# Patient Record
Sex: FEMALE | Race: WHITE | NOT HISPANIC OR LATINO | Employment: OTHER | URBAN - METROPOLITAN AREA
[De-identification: names, ages, dates, MRNs, and addresses within clinical notes are randomized per-mention and may not be internally consistent; named-entity substitution may affect disease eponyms.]

---

## 2017-01-13 ENCOUNTER — GENERIC CONVERSION - ENCOUNTER (OUTPATIENT)
Dept: OTHER | Facility: OTHER | Age: 72
End: 2017-01-13

## 2017-01-31 ENCOUNTER — GENERIC CONVERSION - ENCOUNTER (OUTPATIENT)
Dept: OTHER | Facility: OTHER | Age: 72
End: 2017-01-31

## 2017-03-24 ENCOUNTER — ALLSCRIPTS OFFICE VISIT (OUTPATIENT)
Dept: OTHER | Facility: OTHER | Age: 72
End: 2017-03-24

## 2017-03-24 DIAGNOSIS — M85.80 OTHER SPECIFIED DISORDERS OF BONE DENSITY AND STRUCTURE, UNSPECIFIED SITE: ICD-10-CM

## 2017-03-30 ENCOUNTER — GENERIC CONVERSION - ENCOUNTER (OUTPATIENT)
Dept: OTHER | Facility: OTHER | Age: 72
End: 2017-03-30

## 2017-05-27 DIAGNOSIS — Z12.31 ENCOUNTER FOR SCREENING MAMMOGRAM FOR MALIGNANT NEOPLASM OF BREAST: ICD-10-CM

## 2017-05-30 ENCOUNTER — HOSPITAL ENCOUNTER (OUTPATIENT)
Dept: RADIOLOGY | Age: 72
Discharge: HOME | End: 2017-05-30
Payer: MEDICARE

## 2017-05-30 DIAGNOSIS — M85.80 OTHER SPECIFIED DISORDERS OF BONE DENSITY AND STRUCTURE, UNSPECIFIED SITE: ICD-10-CM

## 2017-05-30 DIAGNOSIS — Z12.31 ENCOUNTER FOR SCREENING MAMMOGRAM FOR MALIGNANT NEOPLASM OF BREAST: ICD-10-CM

## 2017-05-30 PROCEDURE — G0202 SCR MAMMO BI INCL CAD: HCPCS

## 2017-05-30 PROCEDURE — 77080 DXA BONE DENSITY AXIAL: CPT

## 2017-09-19 ENCOUNTER — TRANSCRIBE ORDERS (OUTPATIENT)
Dept: ADMINISTRATIVE | Facility: HOSPITAL | Age: 72
End: 2017-09-19

## 2017-09-19 DIAGNOSIS — R91.8 LUNG MASS: Primary | ICD-10-CM

## 2017-12-11 ENCOUNTER — HOSPITAL ENCOUNTER (OUTPATIENT)
Dept: RADIOLOGY | Age: 72
Discharge: HOME | End: 2017-12-11
Payer: MEDICARE

## 2017-12-11 ENCOUNTER — GENERIC CONVERSION - ENCOUNTER (OUTPATIENT)
Dept: OTHER | Facility: OTHER | Age: 72
End: 2017-12-11

## 2017-12-11 DIAGNOSIS — R91.8 LUNG MASS: ICD-10-CM

## 2017-12-11 PROCEDURE — 71250 CT THORAX DX C-: CPT

## 2017-12-14 ENCOUNTER — GENERIC CONVERSION - ENCOUNTER (OUTPATIENT)
Dept: OTHER | Facility: OTHER | Age: 72
End: 2017-12-14

## 2018-01-09 NOTE — RESULT NOTES
Verified Results  * DXA BONE DENSITY SPINE HIP AND PELVIS [id number] 10:03AM Zakia Gutierrez Order Number: [id number]    - Patient Instructions: To schedule this appointment, please contact Central Scheduling at [phone]  Test Name Result Flag Reference   DXA BONE DENSITY SPINE HIP AND PELVIS (Report)     CENTRAL DXA SCAN     CLINICAL HISTORY:  81year old post-menopausal  female risk factors include previous smoking  History of breast carcinoma and lymphoma  Prior Fosamax use  TECHNIQUE: Bone densitometry was performed using a Hologic Horizon A bone densitometer  Regions of interest appear properly placed  There are no obvious fractures or other confounding variables which could limit the study  Degenerative changes of the    lumbar spine and hip  This will falsely elevate the bone mineral densities in these regions  COMPARISON: Several, most recent May 26, 2015     RESULTS:    LUMBAR SPINE: L1-L4:   BMD 1 0 77 gm/cm2   T-score 0 3   Z-score 2 4     LEFT TOTAL HIP:   BMD 0 843 gm/cm2   T-score -0 8   Z-score 0 7     LEFT FEMORAL NECK:   BMD 0 642 gm/cm2   T-score -1 9   Z-score -0 1     LEFT FOREARM :   BMD 0 629 gm/sq-cm,   T-score is -0 9   Z-score is 1 1          IMPRESSION:   1  Based on the [facility] classification, the T-score of -1 9 in the left hip is consistent with low bone mineral density  2  When compared to the prior examination, there has been NO SIGNIFICANT CHANGE in the total bone mineral density of the lumbar spine, when allowing for the least significant change  There has however been a 3% INCREASE in the total bone mineral    density of the left hip and a 3% INCREASE in the total bone mineral density of the left forearm  3  Any secondary causes of low bone mineral density should be excluded prior to treatment, if clinically indicated     4  A daily intake of at least 1200 mg calcium and 800 to 1000 IU of Vitamin D, as well as weight bearing and muscle strengthening exercise, fall prevention and avoidance of tobacco and excessive alcohol intake as basic preventive measures are suggested  5  Repeat DXA in 18 - 24 months, on the same machine, as clinically indicated  The 10 year risk of hip fracture is 2%, with the 10 year risk of major osteoporotic fracture being 11%, as calculated by the [facility] fracture risk assessment tool (FRAX)  The current NOF guidelines recommend treating patients with FRAX 10 year risk score of   >3% for hip fracture and >20% for major osteoporotic fracture        WHO CLASSIFICATION:   Normal (a T-score of -1 0 or higher)   Low bone mineral density (a T-score of less than -1 0 but higher than -2 5)   Osteoporosis (a T-score of -2 5 or less)   Severe osteoporosis (a T-score of -2 5 or less with a fragility fracture)             Workstation performed: [id number]     Signed by:   Kingston Fall DO   5/30/17

## 2018-01-10 NOTE — MISCELLANEOUS
Message   Recorded as Task   Date: 05/30/2017 02:34 PM, Created By: Dom Lerma   Task Name: Follow Up   Assigned To: Rio Dow   Regarding Patient: Foster Ram, Status: In Progress   CommentCheree Azael - 30 May 2017 2:34 PM     TASK CREATED  Call Katiana Hargrove her bone density test shows osteopenia  This is best treated with calcium 1200 mg a day and vitamin D 1000 units a day plus exercise thank you   Dee Chowdary - 30 May 2017 3:26 PM     TASK IN PROGRESS   Dee Chowdary - 30 May 2017 3:28 PM     TASK EDITED  pt informed        Active Problems    1  Atrophic vaginitis (627 3) (N95 2)   2  Benign positional vertigo (386 11) (H81 10)   3  Encounter for gynecological examination without abnormal finding (V72 31) (Z01 419)   4  Encounter for screening mammogram for malignant neoplasm of breast (V76 12)   (Z12 31)   5  Leiomyoma of uterus (218 9) (D25 9)   6  Multiple lung nodules (793 19) (R91 8)   7  Osteopenia (733 90) (M85 80)   8  Routine Gynecological Exam With Cervical Pap Smear (V72 31)   9  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)    Current Meds   1  B-12 CAPS; take 1 capsule daily; Therapy: (Recorded:53Cpz0341) to Recorded   2  BL Vitamin B-6 TABS; TAKE 1 TABLET DAILY AS DIRECTED; Therapy: (Recorded:63Kjk4531) to Recorded   3  Calcium TABS; take 2 tablet daily; Therapy: (Recorded:21Byb8378) to Recorded   4  Fish Oil CAPS; take 1 capsule daily; Therapy: (Recorded:35Fxh4502) to Recorded   5  Multi-Vitamin TABS; TAKE 1 TABLET DAILY; Therapy: (Recorded:38Osq2054) to Recorded    Allergies    1   No Known Drug Allergies    Signatures   Electronically signed by : Valdo Fonseca, ; May 30 2017  3:28PM EST                       (Author)

## 2018-01-12 NOTE — RESULT NOTES
Verified Results  (1) THIN PREP PAP WITH IMAGING 22Mar2016 11:38AM Malik Oropeza     Test Name Result Flag Reference   LAB AP CASE REPORT (Report)     Gynecologic Cytology Report            Case: [id number]                  Authorizing Provider: Ozzie Jim MD     Collected:      03/22/2016 1138        First Screen:     Wilmer MongeCATINA christine    Received:      03/24/2016 1138        Specimen:  LIQUID-BASED PAP, SCREENING, Cervix   LAB AP GYN PRIMARY INTERPRETATION      Negative for intraepithelial lesion or malignancy   LAB AP GYN SPECIMEN ADEQUACY      Satisfactory for evaluation  (See note)   LAB AP GYN NOTE      No endocervical cells identified  It is difficult to differentiate between   squamous metaplastic cells and parabasal cells in atrophic specimens due   to numerous causes including menopause, postpartum changes and   progestational agents  LAB AP GYN ADDITIONAL INFORMATION (Report)     Yeah[employer]'s FDA approved ,  and ThinPrep Imaging System are   utilized with strict adherence to the 's instruction manual to   prepare gynecologic and non-gynecologic cytology specimens for the   production of ThinPrep slides as well as for gynecologic ThinPrep imaging  These processes have been validated by our laboratory and/or by the     The Pap test is not a diagnostic procedure and should not be used as the   sole means to detect cervical cancer  It is only a screening procedure to   aid in the detection of cervical cancer and its precursors  Both   false-negative and false-positive results have been experienced  Your   patient's test result should be interpreted in this context together with   the history and clinical findings     LAB AP LMP

## 2018-01-12 NOTE — MISCELLANEOUS
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)    Current Meds   1  B-12 CAPS; take 1 capsule daily; Therapy: (Recorded:31Ssp8109) to Recorded   2  BL Vitamin B-6 TABS; TAKE 1 TABLET DAILY AS DIRECTED; Therapy: (Recorded:58Kjl1658) to Recorded   3  Calcium TABS; take 2 tablet daily; Therapy: (Recorded:89Dgh3136) to Recorded   4  Fish Oil CAPS; take 1 capsule daily; Therapy: (Recorded:94Sbd7663) to Recorded   5  Meclizine HCl - 12 5 MG Oral Tablet; Take 1 -> 2 tabs TID PRN; Therapy: 84ZOM3834 to (Last Rx:21Fzk3200)  Requested for: 88Bhx1543 Ordered   6  Multi-Vitamin TABS; TAKE 1 TABLET DAILY; Therapy: (Recorded:07Ykn2915) to Recorded    Allergies    1   No Known Drug Allergies    Signatures   Electronically signed by : Melvi Myers, ; Jan 13 2017 10:57AM EST                       (Author)

## 2018-01-13 VITALS
HEIGHT: 58 IN | WEIGHT: 130 LBS | SYSTOLIC BLOOD PRESSURE: 132 MMHG | BODY MASS INDEX: 27.29 KG/M2 | DIASTOLIC BLOOD PRESSURE: 78 MMHG

## 2018-01-13 NOTE — MISCELLANEOUS
Message   Recorded as Task   Date: 12/10/2016 11:27 AM, Created By: Tommy Tanner   Task Name: Miscellaneous   Assigned To: Flor Zelaya   Regarding Patient: Artist Caller, Status: Active   Comment:    Rj Mistry - 10 Dec 2016 11:27 AM     TASK CREATED  Please send patient an appointment within one or 2 months  She was supposed to have  an appointment on 12/12/16 at 9 AM but I don't see her name anywhere  Thanks,  Fede   pt is r/s for 01/06/2017 office appt  Active Problems    1  Atrophic vaginitis (627 3) (N95 2)   2  Benign positional vertigo (386 11) (H81 10)   3  Encounter for gynecological examination without abnormal finding (V72 31) (Z01 419)   4  Encounter for screening mammogram for malignant neoplasm of breast (V76 12)   (Z12 31)   5  Leiomyoma of uterus (218 9) (D25 9)   6  Multiple lung nodules (793 19) (R91 8)   7  Osteopenia (733 90) (M85 80)   8  Routine Gynecological Exam With Cervical Pap Smear (V72 31)   9  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)    Current Meds   1  B-12 CAPS; take 1 capsule daily; Therapy: (Recorded:42Lie1430) to Recorded   2  BL Vitamin B-6 TABS; TAKE 1 TABLET DAILY AS DIRECTED; Therapy: (Recorded:29Bqn7635) to Recorded   3  Calcium TABS; take 2 tablet daily; Therapy: (Recorded:88Uuf1514) to Recorded   4  Fish Oil CAPS; take 1 capsule daily; Therapy: (Recorded:06Fze5809) to Recorded   5  Meclizine HCl - 12 5 MG Oral Tablet; Take 1 -> 2 tabs TID PRN; Therapy: 81RHB9038 to (Last Rx:70Chw3255)  Requested for: 24Mie3654 Ordered   6  Multi-Vitamin TABS; TAKE 1 TABLET DAILY; Therapy: (Recorded:98Oub3056) to Recorded    Allergies    1   No Known Drug Allergies    Signatures   Electronically signed by : Radha Chandler, ; Dec 14 2016  2:10PM EST                       (Author)

## 2018-01-15 NOTE — RESULT NOTES
Verified Results  * CT CHEST WO CONTRAST [id number] 08:22AM Rox Gamboa Order Number: [id number]     Test Name Result Flag Reference   CT CHEST WO CONTRAST (Report)     CT CHEST WITHOUT IV CONTRAST     INDICATION: Pulmonary nodule  Previous history of lymphoma     COMPARISON: 6/2/2016     TECHNIQUE: CT examination of the chest was performed without intravenous contrast  Axial, sagittal and coronal reformatted images were submitted for interpretation  Coronal thick section MIP (maximal intensity projection) images were also created  This examination, like all CT scans performed in the [facility], was performed utilizing techniques to minimize radiation dose exposure, including the use of iterative reconstruction and automated exposure control  FINDINGS:     LUNGS: Right upper lobe groundglass nodule difficult to visualize on today's axial images but is seen on coronal image 57, measuring approximately 4 mm in size, unchanged from prior  Subpleural nodularity in the posterior right upper lobe on image 3/19   is unchanged  Right middle lobe density on image 3/34 measures approximately 3 to 4 mm in length no larger than prior study  It may be within the bronchus reflecting mucous plug although there is no distal atelectatic change  2 mm nodular opacity    adjacent to the right diaphragm image 3/47, stable  There is further diminishment in size of a nodular density in the right lower lobe, image 3/32, now no greater than 2 mm  There is stable right middle lobe scarring  There is biapical    pleural-parenchymal scarring  No new or enlarging pulmonary opacities are seen  PLEURA: Unremarkable  HEART/GREAT VESSELS: Unremarkable for patient's age  MEDIASTINUM AND MANUEL: Unremarkable  CHEST WALL AND LOWER NECK: Unremarkable  VISUALIZED STRUCTURES IN THE UPPER ABDOMEN: Unremarkable  OSSEOUS STRUCTURES: No acute fracture  No destructive osseous lesion  IMPRESSION:     All previously seen pulmonary nodular opacities are either the same size with diminished in size compared to the previous examination  There are no new or enlarging pulmonary nodules  Therefore, follow-up is recommended in approximately one year's    time  ##fuslh12##fuslh12       Workstation performed: [id number]     Signed by:   Oriana Braga MD   12/7/16       Plan  Multiple lung nodules    · * CT CHEST WO CONTRAST; Status:Need Information - Financial Authorization;   Requested for:00Bbu4486;

## 2018-01-16 NOTE — MISCELLANEOUS
Message  PT CALLED TO STATE SHE RECEIVED A LETTER STATING THAT SHE MISSED HER JANUARY APPT WITH  [id number]  GOING BACK IN THE NOTES, DR MUNOZ ASKED IN DECEMBER, THAT AN APPT BE MADE FOR THE PT IN 1-2 MONTHS  PT STATED ALSO THAT DR MUNOZ HAD SAID BACK IN JUNE 2016 THAT SHE COULD FOLLOW UP WITH DR Dom Lester  SHE ALSO STATES THAT IN NOVEMBER SHE CALLED TO CANCEL THAT DECEMBER APPT  Active Problems    1  Atrophic vaginitis (627 3) (N95 2)   2  Benign positional vertigo (386 11) (H81 10)   3  Encounter for gynecological examination without abnormal finding (V72 31) (Z01 419)   4  Encounter for screening mammogram for malignant neoplasm of breast (V76 12)   (Z12 31)   5  Leiomyoma of uterus (218 9) (D25 9)   6  Multiple lung nodules (793 19) (R91 8)   7  Osteopenia (733 90) (M85 80)   8  Routine Gynecological Exam With Cervical Pap Smear (V72 31)   9  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)    Current Meds   1  B-12 CAPS; take 1 capsule daily; Therapy: (Recorded:10Kbb7566) to Recorded   2  BL Vitamin B-6 TABS; TAKE 1 TABLET DAILY AS DIRECTED; Therapy: (Recorded:63Vdr5110) to Recorded   3  Calcium TABS; take 2 tablet daily; Therapy: (Recorded:42Wwf6624) to Recorded   4  Fish Oil CAPS; take 1 capsule daily; Therapy: (Recorded:50Acf5132) to Recorded   5  Meclizine HCl - 12 5 MG Oral Tablet; Take 1 -> 2 tabs TID PRN; Therapy: 22AKK3924 to (Last Rx:82Ljc3120)  Requested for: 17Aug2016 Ordered   6  Multi-Vitamin TABS; TAKE 1 TABLET DAILY; Therapy: (Recorded:15Mav4378) to Recorded    Allergies    1   No Known Drug Allergies    Signatures   Electronically signed by : Luis Umana, ; Jan 31 2017  1:13PM EST                       (Author)

## 2018-01-16 NOTE — RESULT NOTES
Verified Results  * MAMMO SCREENING BILATERAL W CAD [id number] 09:13AM Katherine Newman Order Number: [id number]     Test Name Result Flag Reference   MAMMO SCREENING BILATERAL W CAD (Report)     Patient History:   Patient is postmenopausal and has history of other cancer at age    81  Family history of premenopausal breast cancer in maternal cousin    at age 29  Patient is a former smoker, and smoked for 20 years  Patient's    BMI is 26 1  Reason for exam: screening (asymptomatic)  Mammo Screening Bilateral W CAD: May 27, 2016 - Check In #:    [de-identified]   Bilateral MLO and CC view(s) were taken  Technologist: ARNALDO Godoy (R)(M)   Prior study comparison: May 26, 2015, digital bilateral screening   mammogram performed at [facility]  May 21,    2014, digital bilateral screening mammogram performed at [facility]  May 20, 2013, digital bilateral    screening mammogram performed at [facility]  May 14, 2012, digital bilateral screening mammogram performed at    [facility]  May 9, 2011, digital bilateral    screening mammogram performed at [facility]  April 26, 2010, digital bilateral screening mammogram performed    at [facility]  There are scattered fibroglandular densities  No dominant soft tissue mass, architectural distortion or    suspicious calcifications are noted in either breast  The skin    and nipple contours are within normal limits  No significant changes when compared with prior studies  ASSESSMENT: BiRad:1 - Negative     Recommendation:   Routine screening mammogram of both breasts in 1 year  A    reminder letter will be scheduled  Analyzed by CAD     8-10% of cancers will be missed on mammography  Management of a    palpable abnormality must be based on clinical grounds  Patients   will be notified of their results via letter from our facility  Accredited by [employer] of [employer] and FDA  Transcription Location: ARNALDO [address]dujar 98: [id number]     Risk Value(s):   Tyrer-Cuzick 10 Year: 3 845%, Tyrer-Cuzick Lifetime: 6 530%,    Myriad Table: 2 6%, FELIZ 5 Year: 2 1%, NCI Lifetime: 6 4%                             (1) THIN PREP PAP WITH IMAGING 22Mar2016 11:38AM Carly Cosme     Test Name Result Flag Reference   LAB AP CASE REPORT (Report)     Gynecologic Cytology Report            Case: [id number]                  Authorizing Provider: Shea Rico MD     Collected:      03/22/2016 1138        First Screen:     Gabby Urias, CT    Received:      03/24/2016 1138        Specimen:  LIQUID-BASED PAP, SCREENING, Cervix   LAB AP GYN PRIMARY INTERPRETATION      Negative for intraepithelial lesion or malignancy   LAB AP GYN SPECIMEN ADEQUACY      Satisfactory for evaluation  (See note)   LAB AP GYN NOTE      No endocervical cells identified  It is difficult to differentiate between   squamous metaplastic cells and parabasal cells in atrophic specimens due   to numerous causes including menopause, postpartum changes and   progestational agents  LAB AP GYN ADDITIONAL INFORMATION (Report)     2 Minutes's FDA approved ,  and ThinPrep Imaging System are   utilized with strict adherence to the 's instruction manual to   prepare gynecologic and non-gynecologic cytology specimens for the   production of ThinPrep slides as well as for gynecologic ThinPrep imaging  These processes have been validated by our laboratory and/or by the     The Pap test is not a diagnostic procedure and should not be used as the   sole means to detect cervical cancer  It is only a screening procedure to   aid in the detection of cervical cancer and its precursors  Both   false-negative and false-positive results have been experienced   Your   patient's test result should be interpreted in this context together with   the history and clinical findings     LAB AP LMP

## 2018-01-18 NOTE — RESULT NOTES
Verified Results  * MAMMO SCREENING BILATERAL W CAD [id number] 10:04AM [facility] Order Number: [id number]    - Patient Instructions: To schedule this appointment, please contact Central Scheduling at [phone]  Do not wear any perfume, powder, lotion or deodorant on breast or underarm area  Please bring your doctors order, referral (if needed) and insurance information with you on the day of the test  Failure to bring this information may result in this test being rescheduled  Arrive 15 minutes prior to your appointment time to register  On the day of your test, please bring any prior mammogram or breast studies with you that were not performed at a [facility]  Failure to bring prior exams may result in your test needing to be rescheduled  Test Name Result Flag Reference   MAMMO SCREENING BILATERAL W CAD (Report)     Patient History:   Patient is postmenopausal and has history of other cancer at age    81  Family history of premenopausal breast cancer at age 29 in    maternal cousin  No Hormone Replacement Therapy   Patient is a former smoker, and smoked for 20 years  Patient's    BMI is 26 1  Reason for exam: screening, asymptomatic  Mammo Screening Bilateral W CAD: May 30, 2017 - Check In #:    [de-identified]   Bilateral MLO and CC view(s) were taken  XCCL view(s) were taken   of the right breast      Technologist: RT Julio(R)(M)   Prior study comparison: May 27, 2016, mammo screening bilateral W   CAD performed at [address] [place] [address]  May 26, 2015,    digital bilateral screening mammogram performed at [address] [place] [address]  May 21, 2014, digital bilateral screening    mammogram performed at [address] [place] [address]  May 20,    2013, digital bilateral screening mammogram performed at [address] [place] [address]  May 14, 2012, digital bilateral    screening mammogram performed at [address] [place] [address]       There are scattered fibroglandular densities  No new dominant    soft tissue mass, architectural distortion or suspicious    calcifications are noted  The skin and nipple structures are    within normal limits  Benign appearing calcifications are noted  Stable asymmetric tissue again noted in the right breast     Stable intramammary lymphnodes noted  No mammographic evidence of malignancy  No    significant changes when compared with prior studies  ACR BI-RADSï¾® Assessments: BiRad:2 - Benign     Recommendation:   Routine screening mammogram of both breasts in 1 year  A    reminder letter will be scheduled  Analyzed by CAD     8-10% of cancers will be missed on mammography  Management of a    palpable abnormality must be based on clinical grounds  Patients   will be notified of their results via letter from our facility  Accredited by [employer] and FDA  Transcription Location: ARNALDO [address] 98: [id number]     Risk Value(s):   Tyrer-Cuzick 10 Year: 3 700%, Tyrer-Cuzick Lifetime: 5 900%,    Myriad Table: 2 6%, FELIZ 5 Year: 2 1%, NCI Lifetime: 6 1%   Signed by:   Jhoana Pineda MD   5/30/17     * DXA BONE DENSITY SPINE HIP AND PELVIS [id number] 10:03AM Fidencio Contreras Order Number: [id number]    - Patient Instructions: To schedule this appointment, please contact Central Scheduling at [phone]  Test Name Result Flag Reference   DXA BONE DENSITY SPINE HIP AND PELVIS (Report)     CENTRAL DXA SCAN     CLINICAL HISTORY:  81year old post-menopausal  female risk factors include previous smoking  History of breast carcinoma and lymphoma  Prior Fosamax use  TECHNIQUE: Bone densitometry was performed using a Hologic Horizon A bone densitometer  Regions of interest appear properly placed  There are no obvious fractures or other confounding variables which could limit the study  Degenerative changes of the    lumbar spine and hip   This will falsely elevate the bone mineral densities in these regions  COMPARISON: Several, most recent May 26, 2015     RESULTS:    LUMBAR SPINE: L1-L4:   BMD 1 0 77 gm/cm2   T-score 0 3   Z-score 2 4     LEFT TOTAL HIP:   BMD 0 843 gm/cm2   T-score -0 8   Z-score 0 7     LEFT FEMORAL NECK:   BMD 0 642 gm/cm2   T-score -1 9   Z-score -0 1     LEFT FOREARM :   BMD 0 629 gm/sq-cm,   T-score is -0 9   Z-score is 1 1          IMPRESSION:   1  Based on the [facility] classification, the T-score of -1 9 in the left hip is consistent with low bone mineral density  2  When compared to the prior examination, there has been NO SIGNIFICANT CHANGE in the total bone mineral density of the lumbar spine, when allowing for the least significant change  There has however been a 3% INCREASE in the total bone mineral    density of the left hip and a 3% INCREASE in the total bone mineral density of the left forearm  3  Any secondary causes of low bone mineral density should be excluded prior to treatment, if clinically indicated  4  A daily intake of at least 1200 mg calcium and 800 to 1000 IU of Vitamin D, as well as weight bearing and muscle strengthening exercise, fall prevention and avoidance of tobacco and excessive alcohol intake as basic preventive measures are suggested  5  Repeat DXA in 18 - 24 months, on the same machine, as clinically indicated  The 10 year risk of hip fracture is 2%, with the 10 year risk of major osteoporotic fracture being 11%, as calculated by the [facility] fracture risk assessment tool (FRAX)  The current NOF guidelines recommend treating patients with FRAX 10 year risk score of   >3% for hip fracture and >20% for major osteoporotic fracture        WHO CLASSIFICATION:   Normal (a T-score of -1 0 or higher)   Low bone mineral density (a T-score of less than -1 0 but higher than -2 5)   Osteoporosis (a T-score of -2 5 or less)   Severe osteoporosis (a T-score of -2 5 or less with a fragility fracture)             Workstation performed: [id number]     Signed by:   Tawana Zelaya DO   5/30/17

## 2018-01-23 NOTE — RESULT NOTES
Verified Results  * CT CHEST WO CONTRAST [id number] 10:28AM Sinan Morley     Test Name Result Flag Reference   CT CHEST WO CONTRAST (Report)     CT CHEST WITHOUT IV CONTRAST     INDICATION: Follow-up lung nodules  History of cutaneous lymphoma  COMPARISON: 12/6/16  TECHNIQUE: CT examination of the chest was performed without intravenous contrast  Reformatted images were created in axial, sagittal, and coronal planes  Radiation dose length product (DLP) for this visit: 66 mGy-cm   This examination, like all CT scans performed in the [facility], was performed utilizing techniques to minimize radiation dose exposure, including the use of iterative    reconstruction and automated exposure control  FINDINGS:     LUNGS: Stable 2 mm pulmonary nodule in the right lower lobe  Ground glass 4 mm opacity in the right upper lobe, only seen on coronal images current and prior exam  Previously pulmonary nodules No new pulmonary nodules  Scarring in the right middle lobe    is stable  There is no tracheal or endobronchial lesion  PLEURA: Unremarkable  HEART/GREAT VESSELS: Unremarkable for patient's age  MEDIASTINUM AND MANUEL: Unremarkable  CHEST WALL AND LOWER NECK:    Normal      VISUALIZED STRUCTURES IN THE UPPER ABDOMEN: Unremarkable  OSSEOUS STRUCTURES: No acute fracture  No destructive osseous lesion  IMPRESSION:     Right lower lobe 2 mm pulmonary nodule and groundglass 4 mm nodule in the right upper lobe, stable since 6/16  No evidence of new pulmonary nodule                  Workstation performed: [id number]     Signed by:   Gregor Smith MD   12/13/17

## 2018-03-27 ENCOUNTER — ANNUAL EXAM (OUTPATIENT)
Dept: OBGYN CLINIC | Facility: CLINIC | Age: 73
End: 2018-03-27
Payer: MEDICARE

## 2018-03-27 VITALS
WEIGHT: 123 LBS | HEIGHT: 58 IN | DIASTOLIC BLOOD PRESSURE: 76 MMHG | BODY MASS INDEX: 25.82 KG/M2 | SYSTOLIC BLOOD PRESSURE: 136 MMHG

## 2018-03-27 DIAGNOSIS — Z01.419 ENCOUNTER FOR GYNECOLOGICAL EXAMINATION (GENERAL) (ROUTINE) WITHOUT ABNORMAL FINDINGS: Primary | ICD-10-CM

## 2018-03-27 DIAGNOSIS — Z12.31 ENCOUNTER FOR SCREENING MAMMOGRAM FOR MALIGNANT NEOPLASM OF BREAST: ICD-10-CM

## 2018-03-27 PROCEDURE — G0145 SCR C/V CYTO,THINLAYER,RESCR: HCPCS | Performed by: OBSTETRICS & GYNECOLOGY

## 2018-03-27 PROCEDURE — G0101 CA SCREEN;PELVIC/BREAST EXAM: HCPCS | Performed by: OBSTETRICS & GYNECOLOGY

## 2018-03-27 RX ORDER — CALCIUM CARBONATE 500(1250)
2 TABLET ORAL DAILY
COMMUNITY

## 2018-03-27 RX ORDER — MULTIVITAMIN
1 TABLET ORAL DAILY
COMMUNITY

## 2018-03-27 RX ORDER — CHOLECALCIFEROL (VITAMIN D3) 25 MCG
1 TABLET,CHEWABLE ORAL DAILY
COMMUNITY

## 2018-03-27 NOTE — PROGRESS NOTES
Assessment/Plan: This 74-year-old the patient is seen today for her annual gyn evaluation  She is a sore on her left labia majora which comes and goes  No problem-specific Assessment & Plan notes found for this encounter  Subjective:      Patient ID: Delio George is a 81 y o  female  74-year-old patient has had no urine infections over the year she does have a sore on  her labia majora which recurs off and  on and clears  spontaneously without treatment  She did have a CT scan of her lungs in December of 2017 which showed 2 stable nodules in her half long  A DEXA scan May 30, 2017 showed osteopenia for which she takes calcium supplements and vitamin-D  She had a normal mammogram May 30, 2017  She has been treated in the past for skin lymphoma  She does have IBS with the occasional episodes of loose bowel movements requiring Imodium  Her bladder function is normal  She does not wear liners or pads for urine stress incontinence  Review of Systems   Constitutional: Negative  HENT: Negative  Eyes: Negative  Respiratory: Negative  Cardiovascular: Negative  Gastrointestinal: Negative  Endocrine: Negative  Genitourinary: Negative  She does have IBS and occasionally requires Imodium for diarrhea  Musculoskeletal: Negative  Skin: Negative  Allergic/Immunologic: Negative  Neurological: Negative  Hematological: Negative  Psychiatric/Behavioral: Negative  Objective:      /76 (BP Location: Left arm, Patient Position: Sitting, Cuff Size: Standard)   Ht 4' 10" (1 473 m)   Wt 55 8 kg (123 lb)   BMI 25 71 kg/m²          Physical Exam   Constitutional: She is oriented to person, place, and time  She appears well-developed and well-nourished  HENT:   Head: Normocephalic  Neck: Normal range of motion  Neck supple  Cardiovascular: Normal rate, regular rhythm, normal heart sounds and intact distal pulses      Pulmonary/Chest: Effort normal and breath sounds normal    Abdominal: Soft  Bowel sounds are normal    Genitourinary: Uterus normal    Musculoskeletal: Normal range of motion  Neurological: She is alert and oriented to person, place, and time  Skin: Skin is warm and dry  Psychiatric: She has a normal mood and affect  Nursing note and vitals reviewed  pelvic exam reveals uterus to be anterior mobile normal size  No adnexal masses are identified  The cervix is normal to appearance  The vaginal mucosa is atrophic  There is no blood or discharge in the vagina  The vulva shows a small  5 mm  cyst superficially in the skin of the left labia majora  It is painless and is very superficial   Rectal exam shows no  masses in the rectum and no nodularity in the cul-de-sac    Breast exam is normal

## 2018-03-27 NOTE — PATIENT INSTRUCTIONS
Patient was told that her breast and pelvic exam are normal   A small sore on her left labia majora was felt to be a gland which was not draining properly  She will call if this produces pain or shows evidence of infection by swelling becoming bright red color

## 2018-04-02 LAB
LAB AP GYN PRIMARY INTERPRETATION: NORMAL
Lab: NORMAL

## 2018-05-31 ENCOUNTER — TRANSCRIBE ORDERS (OUTPATIENT)
Dept: ADMINISTRATIVE | Facility: HOSPITAL | Age: 73
End: 2018-05-31

## 2018-05-31 DIAGNOSIS — M79.9 SOFT TISSUE DISORDER: Primary | ICD-10-CM

## 2018-06-04 ENCOUNTER — HOSPITAL ENCOUNTER (OUTPATIENT)
Dept: RADIOLOGY | Age: 73
Discharge: HOME | End: 2018-06-04
Payer: MEDICARE

## 2018-06-04 DIAGNOSIS — M79.9 SOFT TISSUE DISORDER: ICD-10-CM

## 2018-06-04 PROCEDURE — 70470 CT HEAD/BRAIN W/O & W/DYE: CPT

## 2018-06-04 RX ADMIN — IOHEXOL 85 ML: 350 INJECTION, SOLUTION INTRAVENOUS at 15:34

## 2018-06-05 ENCOUNTER — HOSPITAL ENCOUNTER (OUTPATIENT)
Dept: RADIOLOGY | Age: 73
Discharge: HOME | End: 2018-06-05
Payer: MEDICARE

## 2018-06-05 DIAGNOSIS — Z12.31 ENCOUNTER FOR SCREENING MAMMOGRAM FOR MALIGNANT NEOPLASM OF BREAST: ICD-10-CM

## 2018-06-05 PROCEDURE — 77067 SCR MAMMO BI INCL CAD: CPT

## 2018-09-26 PROBLEM — R10.84 GENERALIZED ABDOMINAL PAIN: Status: ACTIVE | Noted: 2018-09-26

## 2018-09-26 PROBLEM — K58.9 IBS (IRRITABLE BOWEL SYNDROME): Status: ACTIVE | Noted: 2018-09-26

## 2019-04-02 ENCOUNTER — ANNUAL EXAM (OUTPATIENT)
Dept: OBGYN CLINIC | Facility: CLINIC | Age: 74
End: 2019-04-02
Payer: MEDICARE

## 2019-04-02 VITALS
WEIGHT: 127 LBS | DIASTOLIC BLOOD PRESSURE: 74 MMHG | HEIGHT: 58 IN | SYSTOLIC BLOOD PRESSURE: 124 MMHG | BODY MASS INDEX: 26.66 KG/M2

## 2019-04-02 DIAGNOSIS — Z12.31 ENCOUNTER FOR SCREENING MAMMOGRAM FOR MALIGNANT NEOPLASM OF BREAST: Primary | ICD-10-CM

## 2019-04-02 DIAGNOSIS — Z01.419 ENCOUNTER FOR GYNECOLOGICAL EXAMINATION (GENERAL) (ROUTINE) WITHOUT ABNORMAL FINDINGS: ICD-10-CM

## 2019-04-02 PROCEDURE — G0101 CA SCREEN;PELVIC/BREAST EXAM: HCPCS | Performed by: OBSTETRICS & GYNECOLOGY

## 2019-06-10 ENCOUNTER — HOSPITAL ENCOUNTER (OUTPATIENT)
Dept: RADIOLOGY | Age: 74
Discharge: HOME | End: 2019-06-10
Payer: MEDICARE

## 2019-06-10 VITALS — BODY MASS INDEX: 26.45 KG/M2 | WEIGHT: 126 LBS | HEIGHT: 58 IN

## 2019-06-10 DIAGNOSIS — Z12.31 ENCOUNTER FOR SCREENING MAMMOGRAM FOR MALIGNANT NEOPLASM OF BREAST: ICD-10-CM

## 2019-06-10 PROCEDURE — 77067 SCR MAMMO BI INCL CAD: CPT

## 2020-05-12 ENCOUNTER — ANNUAL EXAM (OUTPATIENT)
Dept: OBGYN CLINIC | Facility: CLINIC | Age: 75
End: 2020-05-12
Payer: MEDICARE

## 2020-05-12 VITALS
TEMPERATURE: 98 F | DIASTOLIC BLOOD PRESSURE: 72 MMHG | WEIGHT: 125.4 LBS | BODY MASS INDEX: 26.32 KG/M2 | HEIGHT: 58 IN | SYSTOLIC BLOOD PRESSURE: 140 MMHG

## 2020-05-12 DIAGNOSIS — Z13.820 SCREENING FOR OSTEOPOROSIS: ICD-10-CM

## 2020-05-12 DIAGNOSIS — M85.80 OSTEOPENIA, UNSPECIFIED LOCATION: ICD-10-CM

## 2020-05-12 DIAGNOSIS — Z01.419 ENCOUNTER FOR GYNECOLOGICAL EXAMINATION (GENERAL) (ROUTINE) WITHOUT ABNORMAL FINDINGS: Primary | ICD-10-CM

## 2020-05-12 DIAGNOSIS — Z12.31 ENCOUNTER FOR SCREENING MAMMOGRAM FOR MALIGNANT NEOPLASM OF BREAST: ICD-10-CM

## 2020-05-12 DIAGNOSIS — Z78.0 POST-MENOPAUSAL: ICD-10-CM

## 2020-05-12 PROCEDURE — G0101 CA SCREEN;PELVIC/BREAST EXAM: HCPCS | Performed by: OBSTETRICS & GYNECOLOGY

## 2020-09-01 ENCOUNTER — HOSPITAL ENCOUNTER (OUTPATIENT)
Dept: RADIOLOGY | Age: 75
Discharge: HOME | End: 2020-09-01
Payer: MEDICARE

## 2020-09-01 VITALS — HEIGHT: 58 IN | BODY MASS INDEX: 25.19 KG/M2 | WEIGHT: 120 LBS

## 2020-09-01 DIAGNOSIS — M85.80 OSTEOPENIA, UNSPECIFIED LOCATION: ICD-10-CM

## 2020-09-01 DIAGNOSIS — Z12.31 ENCOUNTER FOR SCREENING MAMMOGRAM FOR MALIGNANT NEOPLASM OF BREAST: ICD-10-CM

## 2020-09-01 DIAGNOSIS — Z78.0 POST-MENOPAUSAL: ICD-10-CM

## 2020-09-01 DIAGNOSIS — Z13.820 SCREENING FOR OSTEOPOROSIS: ICD-10-CM

## 2020-09-01 PROCEDURE — 77063 BREAST TOMOSYNTHESIS BI: CPT

## 2020-09-01 PROCEDURE — 77067 SCR MAMMO BI INCL CAD: CPT

## 2020-09-01 PROCEDURE — 77080 DXA BONE DENSITY AXIAL: CPT

## 2020-09-10 ENCOUNTER — TELEPHONE (OUTPATIENT)
Dept: OBGYN CLINIC | Facility: CLINIC | Age: 75
End: 2020-09-10

## 2020-09-10 NOTE — TELEPHONE ENCOUNTER
----- Message from Foster Panchal MD sent at 9/7/2020  4:09 AM EDT -----  Can you let Checo Kay know that her DEXA is still consistent with osteopenia - and does not meet criteria for treatment at this time    (Some mild decrease since last time however, so would continue her calcium, vit D, weight bearing exercise)

## 2020-09-21 LAB
CHOLEST SERPL-MCNC: 201 MG/DL
CHOLEST/HDLC SERPL: 2.8 (CALC)
HDLC SERPL-MCNC: 71 MG/DL
LDLC SERPL CALC-MCNC: 113 MG/DL (CALC)
NONHDLC SERPL-MCNC: 130 MG/DL (CALC)
TRIGL SERPL-MCNC: 76 MG/DL

## 2020-09-29 ENCOUNTER — TELEPHONE (OUTPATIENT)
Dept: INTERNAL MEDICINE CLINIC | Facility: CLINIC | Age: 75
End: 2020-09-29

## 2021-03-25 ENCOUNTER — OFFICE VISIT (OUTPATIENT)
Dept: INTERNAL MEDICINE CLINIC | Facility: CLINIC | Age: 76
End: 2021-03-25
Payer: MEDICARE

## 2021-03-25 VITALS
HEART RATE: 73 BPM | SYSTOLIC BLOOD PRESSURE: 130 MMHG | TEMPERATURE: 97.5 F | WEIGHT: 125 LBS | HEIGHT: 58 IN | DIASTOLIC BLOOD PRESSURE: 80 MMHG | OXYGEN SATURATION: 97 % | BODY MASS INDEX: 26.24 KG/M2

## 2021-03-25 DIAGNOSIS — R01.1 HEART MURMUR: Primary | ICD-10-CM

## 2021-03-25 DIAGNOSIS — K58.9 IBS (IRRITABLE BOWEL SYNDROME): ICD-10-CM

## 2021-03-25 DIAGNOSIS — C85.90 T-CELL LYMPHOMA (HCC): ICD-10-CM

## 2021-03-25 DIAGNOSIS — Z11.59 NEED FOR HEPATITIS C SCREENING TEST: ICD-10-CM

## 2021-03-25 DIAGNOSIS — E78.5 HYPERLIPIDEMIA, UNSPECIFIED HYPERLIPIDEMIA TYPE: ICD-10-CM

## 2021-03-25 DIAGNOSIS — Z00.00 MEDICARE ANNUAL WELLNESS VISIT, SUBSEQUENT: ICD-10-CM

## 2021-03-25 PROCEDURE — 1123F ACP DISCUSS/DSCN MKR DOCD: CPT | Performed by: INTERNAL MEDICINE

## 2021-03-25 PROCEDURE — G0439 PPPS, SUBSEQ VISIT: HCPCS | Performed by: INTERNAL MEDICINE

## 2021-03-25 PROCEDURE — 99214 OFFICE O/P EST MOD 30 MIN: CPT | Performed by: INTERNAL MEDICINE

## 2021-03-25 NOTE — PROGRESS NOTES
Assessment/Plan:    No problem-specific Assessment & Plan notes found for this encounter  Diagnoses and all orders for this visit:    Need for hepatitis C screening test  -     Hepatitis C antibody; Future    Medicare annual wellness visit, subsequent    Hyperlipidemia, unspecified hyperlipidemia type    T-cell lymphoma ([facility] [address] )          Subjective:      Patient ID: Moises Baptiste is a 70 y o  female  Follow up   Cardiac  Murmur ,hyperlipidemia      The following portions of the patient's history were reviewed and updated as appropriate: allergies, current medications, past family history, past medical history, past social history, past surgical history, and problem list     Review of Systems   Constitutional: Negative  HENT: Negative for dental problem, drooling, ear discharge and ear pain  Eyes: Negative for discharge, redness and itching  Respiratory: Negative for apnea, cough and wheezing  Cardiovascular: Negative for chest pain and palpitations  Gastrointestinal: Negative for abdominal pain, blood in stool, diarrhea and vomiting  Endocrine: Negative for polydipsia, polyphagia and polyuria  Genitourinary: Negative for decreased urine volume, dysuria and frequency  Musculoskeletal: Negative for arthralgias, myalgias and neck stiffness  Skin: Negative for pallor and wound  Allergic/Immunologic: Negative for environmental allergies and food allergies  Neurological: Negative for facial asymmetry, light-headedness, numbness and headaches  Hematological: Negative for adenopathy  Does not bruise/bleed easily  Psychiatric/Behavioral: Negative for agitation, behavioral problems and confusion  BMI Counseling: Body mass index is 26 13 kg/m²  The BMI is above normal  No BMI follow-up plan is appropriate  Patient is 74 years old and weight reduction/weight gain would further complicate their underlying physical disability    Objective:      /80 (BP Location: Left arm, Patient Position: Sitting, Cuff Size: Standard)   Pulse 73   Temp 97 5 °F (36 4 °C) (Temporal)   Ht 4' 10" (1 473 m)   Wt 56 7 kg (125 lb)   SpO2 97%   BMI 26 13 kg/m²          Physical Exam   Assessment Plan:    HEENT  Normal  NECK  Normal  LUNGS  CLear  HEART   Aortic  Valve  Murmur  AbDOMEN  Normal    EXTREMITIES Normal      Echocardiogram  Ordered  To  Assess the  Murmur in the aortic valve  Blood work also ordered  Problem List Items Addressed This Visit     None      Visit Diagnoses     Need for hepatitis C screening test    -  Primary           Preventive health issues were discussed with patient, and age appropriate screening tests were ordered as noted in patient's After Visit Summary  Personalized health advice and appropriate referrals for health education or preventive services given if needed, as noted in patient's After Visit Summary       History of Present Illness:     Patient presents for Medicare Annual Wellness visit    Patient Care Team:  Merlyn Wills MD as PCP - General  Leny Callejas MD     Problem List:     Patient Active Problem List   Diagnosis    Generalized abdominal pain    IBS (irritable bowel syndrome)      Past Medical and Surgical History:     Past Medical History:   Diagnosis Date    Hyperlipidemia     Mild    Lung mass     Lymphoma ([address] )     Menopause     Uterine leiomyoma      Past Surgical History:   Procedure Laterality Date    COLONOSCOPY      complete    ENDOMETRIAL BIOPSY      without cervical dilation    LAPAROSCOPY      (diagnostic)    LEG SURGERY      back of the leg     TONSILLECTOMY      TUBAL LIGATION        Family History:     Family History   Problem Relation Age of Onset    Dementia Mother     Cancer Maternal Aunt     Breast cancer Other     Heart disease Father     No Known Problems Daughter     No Known Problems Maternal Grandmother     Leukemia Maternal Grandfather     No Known Problems Paternal Grandmother     No Known Problems Paternal Grandfather     Breast cancer additional onset Cousin 29    No Known Problems Paternal Aunt     No Known Problems Paternal Aunt     No Known Problems Paternal Aunt       Social History:        Social History     Socioeconomic History    Marital status: /Civil Union     Spouse name: None    Number of children: None    Years of education: None    Highest education level: None   Occupational History    Occupation: retired   Social Needs    Financial resource strain: None    Food insecurity     Worry: None     Inability: None    Transportation needs     Medical: None     Non-medical: None   Tobacco Use    Smoking status: Former Smoker    Smokeless tobacco: Never Used    Tobacco comment: smoked for about 11 years and used about a half a pack a day   Substance and Sexual Activity    Alcohol use: Yes     Comment: Drinks hard liquor, drinks wine, seldom     Drug use: No    Sexual activity: Yes     Partners: Male   Lifestyle    Physical activity     Days per week: None     Minutes per session: None    Stress: None   Relationships    Social connections     Talks on phone: None     Gets together: None     Attends Druze service: None     Active member of club or organization: None     Attends meetings of clubs or organizations: None     Relationship status: None    Intimate partner violence     Fear of current or ex partner: None     Emotionally abused: None     Physically abused: None     Forced sexual activity: None   Other Topics Concern    None   Social History Narrative    Activities-Treadmill    Daily coffee consumption (1 cups/day)    Exercise: walking    Exercising regularly      Medications and Allergies:     Current Outpatient Medications   Medication Sig Dispense Refill    Calcium 500 MG tablet Take 2 tablets by mouth daily      Cyanocobalamin (B-12) 1000 MCG CAPS Take 1 capsule by mouth daily      Multiple Vitamin (MULTI-VITAMIN DAILY) TABS Take 1 tablet by mouth daily      Omega-3 Fatty Acids (FISH OIL) 645 MG CAPS Take 1 capsule by mouth daily      Pyridoxine HCl (BL VITAMIN B-6 PO) Take 1 tablet by mouth daily       No current facility-administered medications for this visit  No Known Allergies   Immunizations:     Immunization History   Administered Date(s) Administered    Influenza, seasonal, injectable 01/01/2014    Pneumococcal Conjugate 13-Valent 01/01/2016    SARS-CoV-2 / COVID-19 mRNA IM (Pfizer-BioNTech) 03/05/2021      Health Maintenance:         Topic Date Due    Hepatitis C Screening  Never done    Colonoscopy Surveillance  06/28/2021    MAMMOGRAM  09/01/2021    Colorectal Cancer Screening  06/28/2026         Topic Date Due    DTaP,Tdap,and Td Vaccines (1 - Tdap) Never done    Pneumococcal Vaccine: 65+ Years (2 of 2 - PPSV23) 01/01/2017    Influenza Vaccine (1) 09/01/2020    COVID-19 Vaccine (2 - Pfizer 2-dose series) 03/26/2021      Medicare Health Risk Assessment:     /80 (BP Location: Left arm, Patient Position: Sitting, Cuff Size: Standard)   Pulse 73   Temp 97 5 °F (36 4 °C) (Temporal)   Ht 4' 10" (1 473 m)   Wt 56 7 kg (125 lb)   SpO2 97%   BMI 26 13 kg/m²      Isak Sharpe is here for her Subsequent Wellness visit  Health Risk Assessment:   Patient rates overall health as good  Patient feels that their physical health rating is same  Patient is very satisfied with their life  Eyesight was rated as same  Hearing was rated as same  Patient feels that their emotional and mental health rating is same  Patients states they are never, rarely angry  Patient states they are never, rarely unusually tired/fatigued  Pain experienced in the last 7 days has been none  Patient states that she has experienced no weight loss or gain in last 6 months  Depression Screening:   PHQ-2 Score: 0      Fall Risk Screening:    In the past year, patient has experienced: no history of falling in past year      Urinary Incontinence Screening:   Patient has leaked urine accidently in the last six months  Home Safety:  Patient has trouble with stairs inside or outside of their home  Patient has working smoke alarms and has working carbon monoxide detector  Home safety hazards include: none  Nutrition:   Current diet is Regular  Medications:   Patient is currently taking over-the-counter supplements  OTC medications include: see medication list  Patient is able to manage medications  Activities of Daily Living (ADLs)/Instrumental Activities of Daily Living (IADLs):   Walk and transfer into and out of bed and chair?: Yes  Dress and groom yourself?: Yes    Bathe or shower yourself?: Yes    Feed yourself? Yes  Do your laundry/housekeeping?: Yes  Manage your money, pay your bills and track your expenses?: Yes  Make your own meals?: Yes    Do your own shopping?: Yes    Previous Hospitalizations:   Any hospitalizations or ED visits within the last 12 months?: No      Advance Care Planning:   Living will: Yes    Durable POA for healthcare: Yes    Advanced directive: Yes      PREVENTIVE SCREENINGS      Cardiovascular Screening:    General: Screening Current      Colorectal Cancer Screening:     General: Screening Current      Breast Cancer Screening:     General: Screening Current      Cervical Cancer Screening:    General: Screening Not Indicated      Lung Cancer Screening:     General: Screening Not Indicated    Screening, Brief Intervention, and Referral to Treatment (SBIRT)    Screening  Typical number of drinks in a day: 0  Typical number of drinks in a week: 0  Interpretation: Low risk drinking behavior      Single Item Drug Screening:  How often have you used an illegal drug (including marijuana) or a prescription medication for non-medical reasons in the past year? never    Single Item Drug Screen Score: 0  Interpretation: Negative screen for possible drug use disorder      Tess Mckinnon MD

## 2021-03-25 NOTE — PATIENT INSTRUCTIONS
Medicare Preventive Visit Patient Instructions  Thank you for completing your Welcome to Medicare Visit or Medicare Annual Wellness Visit today  Your next wellness visit will be due in one year (3/26/2022)  The screening/preventive services that you may require over the next 5-10 years are detailed below  Some tests may not apply to you based off risk factors and/or age  Screening tests ordered at today's visit but not completed yet may show as past due  Also, please note that scanned in results may not display below  Preventive Screenings:  Service Recommendations Previous Testing/Comments   Colorectal Cancer Screening  * Colonoscopy    * Fecal Occult Blood Test (FOBT)/Fecal Immunochemical Test (FIT)  * Fecal DNA/Cologuard Test  * Flexible Sigmoidoscopy Age: 54-65 years old   Colonoscopy: every 10 years (may be performed more frequently if at higher risk)  OR  FOBT/FIT: every 1 year  OR  Cologuard: every 3 years  OR  Sigmoidoscopy: every 5 years  Screening may be recommended earlier than age 48 if at higher risk for colorectal cancer  Also, an individualized decision between you and your healthcare provider will decide whether screening between the ages of 74-80 would be appropriate  Colonoscopy: 06/28/2016  FOBT/FIT: Not on file  Cologuard: Not on file  Sigmoidoscopy: Not on file    Screening Current     Breast Cancer Screening Age: 36 years old  Frequency: every 1-2 years  Not required if history of left and right mastectomy Mammogram: 09/01/2020    Screening Current   Cervical Cancer Screening Between the ages of 21-29, pap smear recommended once every 3 years  Between the ages of 33-67, can perform pap smear with HPV co-testing every 5 years     Recommendations may differ for women with a history of total hysterectomy, cervical cancer, or abnormal pap smears in past  Pap Smear: 05/12/2020    Screening Not Indicated   Hepatitis C Screening Once for adults born between 1945 and 1965  More frequently in patients at high risk for Hepatitis C Hep C Antibody: Not on file        Diabetes Screening 1-2 times per year if you're at risk for diabetes or have pre-diabetes Fasting glucose: No results in last 5 years   A1C: No results in last 5 years        Cholesterol Screening Once every 5 years if you don't have a lipid disorder  May order more often based on risk factors  Lipid panel: 09/21/2020    Screening Current     Other Preventive Screenings Covered by Medicare:  1  Abdominal Aortic Aneurysm (AAA) Screening: covered once if your at risk  You're considered to be at risk if you have a family history of AAA  2  Lung Cancer Screening: covers low dose CT scan once per year if you meet all of the following conditions: (1) Age 50-69; (2) No signs or symptoms of lung cancer; (3) Current smoker or have quit smoking within the last 15 years; (4) You have a tobacco smoking history of at least 30 pack years (packs per day multiplied by number of years you smoked); (5) You get a written order from a healthcare provider  3  Glaucoma Screening: covered annually if you're considered high risk: (1) You have diabetes OR (2) Family history of glaucoma OR (3)  aged 48 and older OR (3)  American aged 72 and older  3  Osteoporosis Screening: covered every 2 years if you meet one of the following conditions: (1) You're estrogen deficient and at risk for osteoporosis based off medical history and other findings; (2) Have a vertebral abnormality; (3) On glucocorticoid therapy for more than 3 months; (4) Have primary hyperparathyroidism; (5) On osteoporosis medications and need to assess response to drug therapy  · Last bone density test (DXA Scan): 09/01/2020   5  HIV Screening: covered annually if you're between the age of 15-65  Also covered annually if you are younger than 13 and older than 72 with risk factors for HIV infection   For pregnant patients, it is covered up to 3 times per pregnancy  Immunizations:  Immunization Recommendations   Influenza Vaccine Annual influenza vaccination during flu season is recommended for all persons aged >= 6 months who do not have contraindications   Pneumococcal Vaccine (Prevnar and Pneumovax)  * Prevnar = PCV13  * Pneumovax = PPSV23   Adults 25-60 years old: 1-3 doses may be recommended based on certain risk factors  Adults 72 years old: Prevnar (PCV13) vaccine recommended followed by Pneumovax (PPSV23) vaccine  If already received PPSV23 since turning 65, then PCV13 recommended at least one year after PPSV23 dose  Hepatitis B Vaccine 3 dose series if at intermediate or high risk (ex: diabetes, end stage renal disease, liver disease)   Tetanus (Td) Vaccine - COST NOT COVERED BY MEDICARE PART B Following completion of primary series, a booster dose should be given every 10 years to maintain immunity against tetanus  Td may also be given as tetanus wound prophylaxis  Tdap Vaccine - COST NOT COVERED BY MEDICARE PART B Recommended at least once for all adults  For pregnant patients, recommended with each pregnancy  Shingles Vaccine (Shingrix) - COST NOT COVERED BY MEDICARE PART B  2 shot series recommended in those aged 48 and above     Health Maintenance Due:      Topic Date Due    Hepatitis C Screening  Never done    Colonoscopy Surveillance  06/28/2021    MAMMOGRAM  09/01/2021    Colorectal Cancer Screening  06/28/2026     Immunizations Due:      Topic Date Due    DTaP,Tdap,and Td Vaccines (1 - Tdap) Never done    Pneumococcal Vaccine: 65+ Years (2 of 2 - PPSV23) 01/01/2017    Influenza Vaccine (1) 09/01/2020    COVID-19 Vaccine (2 - Pfizer 2-dose series) 03/26/2021     Advance Directives   What are advance directives? Advance directives are legal documents that state your wishes and plans for medical care  These plans are made ahead of time in case you lose your ability to make decisions for yourself   Advance directives can apply to any medical decision, such as the treatments you want, and if you want to donate organs  What are the types of advance directives? There are many types of advance directives, and each state has rules about how to use them  You may choose a combination of any of the following:  · Living will: This is a written record of the treatment you want  You can also choose which treatments you do not want, which to limit, and which to stop at a certain time  This includes surgery, medicine, IV fluid, and tube feedings  · Durable power of  for healthcare [facility]): This is a written record that states who you want to make healthcare choices for you when you are unable to make them for yourself  This person, called a proxy, is usually a family member or a friend  You may choose more than 1 proxy  · Do not resuscitate (DNR) order:  A DNR order is used in case your heart stops beating or you stop breathing  It is a request not to have certain forms of treatment, such as CPR  A DNR order may be included in other types of advance directives  · Medical directive: This covers the care that you want if you are in a coma, near death, or unable to make decisions for yourself  You can list the treatments you want for each condition  Treatment may include pain medicine, surgery, blood transfusions, dialysis, IV or tube feedings, and a ventilator (breathing machine)  · Values history: This document has questions about your views, beliefs, and how you feel and think about life  This information can help others choose the care that you would choose  Why are advance directives important? An advance directive helps you control your care  Although spoken wishes may be used, it is better to have your wishes written down  Spoken wishes can be misunderstood, or not followed  Treatments may be given even if you do not want them  An advance directive may make it easier for your family to make difficult choices about your care  Urinary Incontinence   Urinary incontinence (UI)  is when you lose control of your bladder  UI develops because your bladder cannot store or empty urine properly  The 3 most common types of UI are stress incontinence, urge incontinence, or both  Medicines:   · May be given to help strengthen your bladder control  Report any side effects of medication to your healthcare provider  Do pelvic muscle exercises often:  Your pelvic muscles help you stop urinating  Squeeze these muscles tight for 5 seconds, then relax for 5 seconds  Gradually work up to squeezing for 10 seconds  Do 3 sets of 15 repetitions a day, or as directed  This will help strengthen your pelvic muscles and improve bladder control  Train your bladder:  Go to the bathroom at set times, such as every 2 hours, even if you do not feel the urge to go  You can also try to hold your urine when you feel the urge to go  For example, hold your urine for 5 minutes when you feel the urge to go  As that becomes easier, hold your urine for 10 minutes  Self-care:   · Keep a UI record  Write down how often you leak urine and how much you leak  Make a note of what you were doing when you leaked urine  · Drink liquids as directed  You may need to limit the amount of liquid you drink to help control your urine leakage  Do not drink any liquid right before you go to bed  Limit or do not have drinks that contain caffeine or alcohol  · Prevent constipation  Eat a variety of high-fiber foods  Good examples are high-fiber cereals, beans, vegetables, and whole-grain breads  Walking is the best way to trigger your intestines to have a bowel movement  · Exercise regularly and maintain a healthy weight  Weight loss and exercise will decrease pressure on your bladder and help you control your leakage  · Use a catheter as directed  to help empty your bladder  A catheter is a tiny, plastic tube that is put into your bladder to drain your urine     · Go to behavior therapy as directed  Behavior therapy may be used to help you learn to control your urge to urinate  Weight Management   Why it is important to manage your weight:  Being overweight increases your risk of health conditions such as heart disease, high blood pressure, type 2 diabetes, and certain types of cancer  It can also increase your risk for osteoarthritis, sleep apnea, and other respiratory problems  Aim for a slow, steady weight loss  Even a small amount of weight loss can lower your risk of health problems  How to lose weight safely:  A safe and healthy way to lose weight is to eat fewer calories and get regular exercise  You can lose up about 1 pound a week by decreasing the number of calories you eat by 500 calories each day  Healthy meal plan for weight management:  A healthy meal plan includes a variety of foods, contains fewer calories, and helps you stay healthy  A healthy meal plan includes the following:  · Eat whole-grain foods more often  A healthy meal plan should contain fiber  Fiber is the part of grains, fruits, and vegetables that is not broken down by your body  Whole-grain foods are healthy and provide extra fiber in your diet  Some examples of whole-grain foods are whole-wheat breads and pastas, oatmeal, brown rice, and bulgur  · Eat a variety of vegetables every day  Include dark, leafy greens such as spinach, kale, karri greens, and mustard greens  Eat yellow and orange vegetables such as carrots, sweet potatoes, and winter squash  · Eat a variety of fruits every day  Choose fresh or canned fruit (canned in its own juice or light syrup) instead of juice  Fruit juice has very little or no fiber  · Eat low-fat dairy foods  Drink fat-free (skim) milk or 1% milk  Eat fat-free yogurt and low-fat cottage cheese  Try low-fat cheeses such as mozzarella and other reduced-fat cheeses  · Choose meat and other protein foods that are low in fat    Choose beans or other legumes such as split peas or lentils  Choose fish, skinless poultry (chicken or turkey), or lean cuts of red meat (beef or pork)  Before you cook meat or poultry, cut off any visible fat  · Use less fat and oil  Try baking foods instead of frying them  Add less fat, such as margarine, sour cream, regular salad dressing and mayonnaise to foods  Eat fewer high-fat foods  Some examples of high-fat foods include french fries, doughnuts, ice cream, and cakes  · Eat fewer sweets  Limit foods and drinks that are high in sugar  This includes candy, cookies, regular soda, and sweetened drinks  Exercise:  Exercise at least 30 minutes per day on most days of the week  Some examples of exercise include walking, biking, dancing, and swimming  You can also fit in more physical activity by taking the stairs instead of the elevator or parking farther away from stores  Ask your healthcare provider about the best exercise plan for you  © Copyright [employer] 2018 Information is for End User's use only and may not be sold, redistributed or otherwise used for commercial purposes   All illustrations and images included in CareNotes® are the copyrighted property of [employer] M [employer]  or [address] [place] [address]

## 2021-03-30 LAB
ALBUMIN SERPL-MCNC: 4.1 G/DL (ref 3.6–5.1)
ALBUMIN/GLOB SERPL: 1.8 (CALC) (ref 1–2.5)
ALP SERPL-CCNC: 48 U/L (ref 37–153)
ALT SERPL-CCNC: 20 U/L (ref 6–29)
AST SERPL-CCNC: 23 U/L (ref 10–35)
BASOPHILS # BLD AUTO: 42 CELLS/UL (ref 0–200)
BASOPHILS NFR BLD AUTO: 0.6 %
BILIRUB SERPL-MCNC: 0.4 MG/DL (ref 0.2–1.2)
BUN SERPL-MCNC: 23 MG/DL (ref 7–25)
BUN/CREAT SERPL: NORMAL (CALC) (ref 6–22)
CALCIUM SERPL-MCNC: 9.5 MG/DL (ref 8.6–10.4)
CHLORIDE SERPL-SCNC: 105 MMOL/L (ref 98–110)
CHOLEST SERPL-MCNC: 194 MG/DL
CHOLEST/HDLC SERPL: 2.6 (CALC)
CO2 SERPL-SCNC: 30 MMOL/L (ref 20–32)
CREAT SERPL-MCNC: 0.73 MG/DL (ref 0.6–0.93)
EOSINOPHIL # BLD AUTO: 77 CELLS/UL (ref 15–500)
EOSINOPHIL NFR BLD AUTO: 1.1 %
ERYTHROCYTE [DISTWIDTH] IN BLOOD BY AUTOMATED COUNT: 12.6 % (ref 11–15)
GLOBULIN SER CALC-MCNC: 2.3 G/DL (CALC) (ref 1.9–3.7)
GLUCOSE SERPL-MCNC: 88 MG/DL (ref 65–99)
HCT VFR BLD AUTO: 42.9 % (ref 35–45)
HCV AB S/CO SERPL IA: 0.01
HCV AB SERPL QL IA: NORMAL
HDLC SERPL-MCNC: 74 MG/DL
HGB BLD-MCNC: 13.9 G/DL (ref 11.7–15.5)
LDLC SERPL CALC-MCNC: 103 MG/DL (CALC)
LYMPHOCYTES # BLD AUTO: 1925 CELLS/UL (ref 850–3900)
LYMPHOCYTES NFR BLD AUTO: 27.5 %
MCH RBC QN AUTO: 30 PG (ref 27–33)
MCHC RBC AUTO-ENTMCNC: 32.4 G/DL (ref 32–36)
MCV RBC AUTO: 92.5 FL (ref 80–100)
MONOCYTES # BLD AUTO: 770 CELLS/UL (ref 200–950)
MONOCYTES NFR BLD AUTO: 11 %
NEUTROPHILS # BLD AUTO: 4186 CELLS/UL (ref 1500–7800)
NEUTROPHILS NFR BLD AUTO: 59.8 %
NONHDLC SERPL-MCNC: 120 MG/DL (CALC)
PLATELET # BLD AUTO: 208 THOUSAND/UL (ref 140–400)
PMV BLD REES-ECKER: 11.2 FL (ref 7.5–12.5)
POTASSIUM SERPL-SCNC: 4.7 MMOL/L (ref 3.5–5.3)
PROT SERPL-MCNC: 6.4 G/DL (ref 6.1–8.1)
RBC # BLD AUTO: 4.64 MILLION/UL (ref 3.8–5.1)
SL AMB EGFR AFRICAN AMERICAN: 95 ML/MIN/1.73M2
SL AMB EGFR NON AFRICAN AMERICAN: 82 ML/MIN/1.73M2
SODIUM SERPL-SCNC: 140 MMOL/L (ref 135–146)
TRIGL SERPL-MCNC: 76 MG/DL
WBC # BLD AUTO: 7 THOUSAND/UL (ref 3.8–10.8)

## 2021-04-20 ENCOUNTER — HOSPITAL ENCOUNTER (OUTPATIENT)
Dept: RADIOLOGY | Facility: HOSPITAL | Age: 76
Discharge: HOME | End: 2021-04-20
Attending: ORTHOPAEDIC SURGERY
Payer: MEDICARE

## 2021-04-20 ENCOUNTER — OFFICE VISIT (OUTPATIENT)
Dept: OBGYN CLINIC | Facility: HOSPITAL | Age: 76
End: 2021-04-20
Payer: MEDICARE

## 2021-04-20 VITALS
BODY MASS INDEX: 25.82 KG/M2 | HEIGHT: 58 IN | SYSTOLIC BLOOD PRESSURE: 160 MMHG | HEART RATE: 98 BPM | DIASTOLIC BLOOD PRESSURE: 80 MMHG | WEIGHT: 123 LBS

## 2021-04-20 DIAGNOSIS — M79.604 PAIN OF RIGHT LOWER EXTREMITY: Primary | ICD-10-CM

## 2021-04-20 DIAGNOSIS — M79.604 PAIN OF RIGHT LOWER EXTREMITY: ICD-10-CM

## 2021-04-20 DIAGNOSIS — M54.16 RADICULOPATHY, LUMBAR REGION: ICD-10-CM

## 2021-04-20 PROCEDURE — 99203 OFFICE O/P NEW LOW 30 MIN: CPT | Performed by: ORTHOPAEDIC SURGERY

## 2021-04-20 PROCEDURE — 73590 X-RAY EXAM OF LOWER LEG: CPT

## 2021-04-20 NOTE — PROGRESS NOTES
Assessment:  1  Pain of right lower extremity  XR tibia fibula 2 vw right    Ambulatory referral to Physical Therapy    Ambulatory referral to Pain Management   2  Radiculopathy, lumbar region  Ambulatory referral to Physical Therapy    Ambulatory referral to Pain Management       Plan:  The patient has an examination consistent with lumbar radiculopathy affecting the RLE  I have discussed with the patient the pathophysiology of this diagnosis and reviewed how the examination correlates with this diagnosis  Treatment options were discussed at length and after discussing these treatment options, the patient was provided with a referral to physical therapy  I would also like the patient to follow up with my colleague Dr Urbano Saldana for evaluation and treatment  To do next visit:  Return if symptoms worsen or fail to improve  The above stated was discussed in layman's terms and the patient expressed understanding  All questions were answered to the patient's satisfaction  Subjective:   Devante Franklin is a 78 y o  female who presents with a chief complaint of right lower leg pain  The pain began several years and is not associated with an acute injury  The patient describes the pain as aching and dull in intensity,  intermittent in timing, and localizes the pain to the  right lateral aspect of the lower leg  The pain is worse with walking slow but not fast and relieved by rest   The pain is associated with tingling over the dorsal aspect of the foot  The pain is not associated with constitutional symptoms  The patient is awoken at night by the pain  Patient does have a history of a herniated disc but is unsure at to the level          Past Medical History:   Diagnosis Date    Hyperlipidemia     Mild    Lung mass     Lymphoma ([facility] [address] )     Menopause     Uterine leiomyoma        Past Surgical History:   Procedure Laterality Date    COLONOSCOPY      complete    ENDOMETRIAL BIOPSY      without cervical dilation    LAPAROSCOPY      (diagnostic)    LEG SURGERY      back of the leg     TONSILLECTOMY      TUBAL LIGATION         Family History   Problem Relation Age of Onset    Dementia Mother     Cancer Maternal Aunt     Breast cancer Other     Heart disease Father     No Known Problems Daughter     No Known Problems Maternal Grandmother     Leukemia Maternal Grandfather     No Known Problems Paternal Grandmother     No Known Problems Paternal Grandfather     Breast cancer additional onset Cousin 29    No Known Problems Paternal Aunt     No Known Problems Paternal Aunt     No Known Problems Paternal Aunt        Social History     Occupational History    Occupation: retired   Tobacco Use    Smoking status: Former Smoker    Smokeless tobacco: Never Used    Tobacco comment: smoked for about 11 years and used about a half a pack a day   Substance and Sexual Activity    Alcohol use: Yes     Comment: Drinks hard liquor, drinks wine, seldom     Drug use: No    Sexual activity: Yes     Partners: Male         Current Outpatient Medications:     Calcium 500 MG tablet, Take 2 tablets by mouth daily, Disp: , Rfl:     Cyanocobalamin (B-12) 1000 MCG CAPS, Take 1 capsule by mouth daily, Disp: , Rfl:     Multiple Vitamin (MULTI-VITAMIN DAILY) TABS, Take 1 tablet by mouth daily, Disp: , Rfl:     Omega-3 Fatty Acids (FISH OIL) 645 MG CAPS, Take 1 capsule by mouth daily, Disp: , Rfl:     Pyridoxine HCl (BL VITAMIN B-6 PO), Take 1 tablet by mouth daily, Disp: , Rfl:     No Known Allergies      Objective:  Vitals:    04/20/21 1452   BP: 160/80   Pulse: 98       Review of Systems   Constitutional: Negative for chills and fever  HENT: Negative for drooling and sneezing  Eyes: Negative for redness  Respiratory: Negative for cough and wheezing  Gastrointestinal: Negative for nausea and vomiting     Musculoskeletal:        Please see ortho exam   Psychiatric/Behavioral: Negative for behavioral problems  The patient is not nervous/anxious  Right Knee Exam     Tenderness   The patient is experiencing no tenderness  Range of Motion   The patient has normal right knee ROM  Comments:  Hip flexion 5/5, quads 5/5, hamstring 5/5, dorsi flexion 4/5, plantar flexion 4/5,  Knee jerk WNL  diminshed ankle jerk        Left Knee Exam     Muscle Strength   The patient has normal left knee strength  Range of Motion   The patient has normal left knee ROM  Comments:    Knee jerk WNL  Diminshed ankle jerk            Physical Exam  Vitals signs reviewed  Constitutional:       Appearance: She is well-developed  Eyes:      Pupils: Pupils are equal, round, and reactive to light  Pulmonary:      Effort: Pulmonary effort is normal       Breath sounds: Normal breath sounds  Skin:     General: Skin is warm and dry  Neurological:      Mental Status: She is alert and oriented to person, place, and time  Psychiatric:         Behavior: Behavior normal          Thought Content: Thought content normal          Judgment: Judgment normal            Diagnostics, reviewed and taken today if performed as documented: The attending physician has personally reviewed the pertinent films in PACS and interpretation is as follows: left tip-fib no fracture or dislocation, hardware present from prior PCL surgery  Procedures, if performed today:    Procedures    None performed        Scribe Attestation    I,:  Eneida Moses am acting as a scribe while in the presence of the attending physician :       I,:  Jenna Covarrubias MD personally performed the services described in this documentation    as scribed in my presence :               Portions of the record may have been created with voice recognition software  Occasional wrong word or "sound a like" substitutions may have occurred due to the inherent limitations of voice recognition software    Read the chart carefully and recognize, using context, where substitutions have occurred

## 2021-04-27 ENCOUNTER — EVALUATION (OUTPATIENT)
Dept: PHYSICAL THERAPY | Facility: REHABILITATION | Age: 76
End: 2021-04-27
Payer: MEDICARE

## 2021-04-27 DIAGNOSIS — M79.604 PAIN OF RIGHT LOWER EXTREMITY: Primary | ICD-10-CM

## 2021-04-27 DIAGNOSIS — M54.16 RADICULOPATHY, LUMBAR REGION: ICD-10-CM

## 2021-04-27 PROCEDURE — 97110 THERAPEUTIC EXERCISES: CPT | Performed by: PHYSICAL THERAPIST

## 2021-04-27 PROCEDURE — 97161 PT EVAL LOW COMPLEX 20 MIN: CPT | Performed by: PHYSICAL THERAPIST

## 2021-04-27 NOTE — PROGRESS NOTES
PT Evaluation     Today's date: 2021  Patient name: Anival Burciaga  : [id number]  MRN: [id number]  Referring provider: Karyle Lemon, MD  Dx:   Encounter Diagnosis     ICD-10-CM    1  Pain of right lower extremity  M79 604 Ambulatory referral to Physical Therapy   2  Radiculopathy, lumbar region  M54 16 Ambulatory referral to Physical Therapy                  Assessment  Assessment details: Anival Burciaga is a pleasant 78 y o  female who presents with right lower leg pain  The patient's greatest concerns are the pain she is experiencing, worry over not knowing what's wrong, fear of not being able to keep active and future ill health (and wanting to prevent it)  No further referral appears necessary at this time based upon examination results  The primary movement problem is mobility deficit of the lumbar spine mostly into extension, resulting in pathoanatomical symptoms of anterolateral lower leg pain and right sided low back pain, which is limiting her ability to stand for long periods of time, walk for long periods of time, and perform other ADL's  The patient reported she has been dealing with insidious right lower leg pain just on the outside of her shin that travels down to the top of her foot periodically with no specific incident that precipitated the onset  Reports that the only time she really feels the pain is worse is standing for long periods of time, walking at slower paces, and sometimes laying on her side at night, and can be taken away at that time by lying supine  Upon exam, her lower leg pain was not reproduced with active nor passive movements  She does have a history of an HNP in the past of her lumbar spine, and reported LBP in the past, as she worked as a  for about 40 years   Her long standing history of back pain may have led to an insidious nerve related pain she is now experiencing in the common fibular distribution that may be originating from the lumbar spine  She was restricted with extension ROM, had poor strength of her hips, poor core stabilization, and neural tension reported with slump, tibial, and common fibular neural tension testing  The patient would benefit from skilled physical therapy to address her current deficits, improve her quality of life, and restore her PLOF  Primary Impairments:  1) mobility deficits of the lumbar spine  2) poor core stability     Etiologic factors include none recalled by the patient  Impairments: abnormal coordination, abnormal gait, abnormal muscle tone, abnormal or restricted ROM, abnormal movement, activity intolerance, impaired balance, impaired physical strength, lacks appropriate home exercise program, pain with function, poor posture  and poor body mechanics    Symptom irritability: moderateUnderstanding of Dx/Px/POC: good   Prognosis: good  Prognosis details: Positive prognostic indicators include positive attitude toward recovery  Negative prognostic indicators include chronicity of symptoms, age, prior history of low back pain, and previous history of smoking  Goals  Impairment Based Goals:   Patient will improve FOTO score greater than predicted increase in 4 weeks  Patient will have reduced pain score of at least 50% in 4 weeks  Patient will improve strength by at least 1/2 MMT grade in 4 weeks  Functional based Goals: (upon discharge)  Patient will be independent with home exercise program    Patient will be able to manage symptoms independently  Patient will be able to walk independently up to one mile without limitations from right lower leg pain  Patient will be able to stand for up to 30 minutes without limitations from right lower leg pain  Plan  Plan details: Prognosis above is given PT services 1x/week over the next 1-2 months and home program adherence    Patient would benefit from: skilled physical therapy  Referral necessary: No  Planned modality interventions: thermotherapy: hydrocollator packs  Planned therapy interventions: activity modification, joint mobilization, manual therapy, motor coordination training, neuromuscular re-education, patient education, self care, therapeutic activities, therapeutic exercise, graded activity, home exercise program, behavior modification, balance, balance/weight bearing training, abdominal trunk stabilization, strengthening, stretching, body mechanics training, breathing training, gait training, functional ROM exercises and flexibility  Plan of Care beginning date: 2021  Treatment plan discussed with: patient        Subjective Evaluation    History of Present Illness  Mechanism of injury: I have been having some annoying pain around my back and leg for the past 3 years  There is no rhyme or reason to the pain that I experience  I like to walk at least one mile every day  The faster I go, the less it bothers me  If I am walking slower, it hurts me much more  I have to sit down, wait, and then start again  Sometimes, when I go to bed at night, I like to start sleeping on my side, and sometimes that will bother me, and if I go to lay on my back, it will relieve it  I mostly feel it on the outside of my lower leg and sometimes will travel to the top of my foot  It does not necessarily hinder my activities per say, I just keep doing them  Years ago I had a herniated disc in my back, but this was a long time ago  I was going to a chiropractor 3x/week until it got under control, which took about 6 weeks     Pain  Current pain ratin  At best pain ratin  At worst pain ratin  Quality: dull ache  Relieving factors: medications, change in position, relaxation and rest  Aggravating factors: walking and standing    Treatments  Previous treatment: medication  Current treatment: physical therapy  Patient Goals  Patient goals for therapy: decreased pain, improved balance, increased motion, increased strength, independence with ADLs/IADLs and return to sport/leisure activities          Objective     General Comments:      Lumbar Comments  Lumbar AROM:   Flexion: WNL   Extension: 50% limited with tightness   Sidebending: R 25% limited  L 25% limited     Posture: Loss of lumbar lordosis, forward head, rounded shoulders     Gait: Normal     Lower Quarter Screen:   Patellar Tendon Reflex: R 3+  L 3+   Achilles Tendon Reflex: R 2+ L 2+   Myotomes: Intact and symmetrical   Dermatomes: intact and symmetrical     Red Flags: Unremarkable     Slump Test: (+) for neural tension B, did not reproduce anterolateral lower leg pain   Passive SLR: (-)   Active SLR: (-)     Hip Cleared: Yes     Palpation: No tenderness     Joint Play: Hypomobile Lumbar spine     Strength:    Hip Abduction: 4-/5 B   Hip Extension: 4-/5 B   Knee Extension: 5/5 B   Knee Flexion: 5/5 B     Common Fibular Neural Tension: (+) B   Tibial Neural Tension: (+) B                Precautions: History of Lymphoma       Manuals 4/27            Lumbar Flexion Mobs                                                    Neuro Re-Ed             Common Fib Nerve Glides  20x                                                                                          Ther Ex             Nustep             Flexion in Sitting  15x            Double Knee To Chest  20x            Lumbar Rollouts                                                                 Ther Activity                                       Gait Training                                       Modalities

## 2021-05-03 ENCOUNTER — OFFICE VISIT (OUTPATIENT)
Dept: PHYSICAL THERAPY | Facility: REHABILITATION | Age: 76
End: 2021-05-03
Payer: MEDICARE

## 2021-05-03 DIAGNOSIS — M79.604 PAIN OF RIGHT LOWER EXTREMITY: Primary | ICD-10-CM

## 2021-05-03 DIAGNOSIS — M54.16 RADICULOPATHY, LUMBAR REGION: ICD-10-CM

## 2021-05-03 PROCEDURE — 97112 NEUROMUSCULAR REEDUCATION: CPT | Performed by: PHYSICAL THERAPIST

## 2021-05-03 PROCEDURE — 97110 THERAPEUTIC EXERCISES: CPT | Performed by: PHYSICAL THERAPIST

## 2021-05-03 NOTE — PROGRESS NOTES
Daily Note     Today's date: 5/3/2021  Patient name: Devante Franklin  :   MRN: [id number]  Referring provider: Apoorva Eaton MD  Dx:   Encounter Diagnosis     ICD-10-CM    1  Pain of right lower extremity  M79 604    2  Radiculopathy, lumbar region  M54 16                   Subjective: Doing pretty good  The first time I did the exercises I had a bit more pain, but then it got better since then  Objective: See treatment diary below      Assessment: Patient responded well to first treatment today  Added in some progressions for core stabilization  Did require shortening moment arm of hamstrings to feel glutes engage with bridge exercise  Would continue to benefit from PT  Plan: Continue per plan of care        Precautions: History of Lymphoma       Manuals  5/3           Lumbar Flexion Mobs                                                    Neuro Re-Ed             Common Fib Nerve Glides  20x 20x manual           Bridges   3x10            TrA supine Marches   30"x3                                                               Ther Ex             Nustep  12' L4           Flexion in Sitting  15x 15x5"           Double Knee To Chest  20x 20x5"            Lumbar Rollouts             Lower Trunk Rotation   20x5"                                                   Ther Activity                                       Gait Training                                       Modalities

## 2021-05-04 ENCOUNTER — HOSPITAL ENCOUNTER (OUTPATIENT)
Dept: NON INVASIVE DIAGNOSTICS | Facility: CLINIC | Age: 76
Discharge: HOME | End: 2021-05-04
Payer: MEDICARE

## 2021-05-04 DIAGNOSIS — R01.1 HEART MURMUR: ICD-10-CM

## 2021-05-04 PROCEDURE — 93306 TTE W/DOPPLER COMPLETE: CPT

## 2021-05-04 PROCEDURE — 93306 TTE W/DOPPLER COMPLETE: CPT | Performed by: INTERNAL MEDICINE

## 2021-05-10 ENCOUNTER — OFFICE VISIT (OUTPATIENT)
Dept: PHYSICAL THERAPY | Facility: REHABILITATION | Age: 76
End: 2021-05-10
Payer: MEDICARE

## 2021-05-10 DIAGNOSIS — M79.604 PAIN OF RIGHT LOWER EXTREMITY: Primary | ICD-10-CM

## 2021-05-10 DIAGNOSIS — M54.16 RADICULOPATHY, LUMBAR REGION: ICD-10-CM

## 2021-05-10 PROCEDURE — 97112 NEUROMUSCULAR REEDUCATION: CPT

## 2021-05-10 PROCEDURE — 97110 THERAPEUTIC EXERCISES: CPT

## 2021-05-10 NOTE — PROGRESS NOTES
Daily Note     Today's date: 5/10/2021  Patient name: Devante Franklin  : [id number]  MRN: [id number]  Referring provider: Apoorva Eaton MD  Dx:   Encounter Diagnosis     ICD-10-CM    1  Pain of right lower extremity  M79 604    2  Radiculopathy, lumbar region  M54 16                   Subjective: Pt states she feels her overall pain increase throughout the day and has difficulties determining what increases the pain in her day  Objective: See treatment diary below      Assessment: Tolerated treatment well  Patient would benefit from continued PT  Pt performed some new exercises today with no increase of pain during or after session  Pt has appointment with pain management at end of the month regarding her issue, will continue with PT until that time and assess from there  Pt  1:1 with SPTA for entirety  Plan: Continue per plan of care        Precautions: History of Lymphoma       Manuals  5/3 5/10          Lumbar Flexion Mobs                                                    Neuro Re-Ed             Common Fib Nerve Glides  20x 20x manual 20x manual          Bridges   3x10  3x10          TrA supine Marches   30"x3 2x15 3"                                                              Ther Ex             Nustep  12' L4 10' L4          Flexion in Sitting  15x 15x5" 15x5"          Double Knee To Chest  20x 20x5"  20x5"          Lumbar Rollouts   Flexion 10x5"          Lower Trunk Rotation   20x5"  20x5"          Standing Prostretch   2x10 2"                                    Ther Activity                                       Gait Training                                       Modalities

## 2021-05-17 ENCOUNTER — ANNUAL EXAM (OUTPATIENT)
Dept: OBGYN CLINIC | Facility: CLINIC | Age: 76
End: 2021-05-17
Payer: MEDICARE

## 2021-05-17 VITALS
BODY MASS INDEX: 25.94 KG/M2 | SYSTOLIC BLOOD PRESSURE: 122 MMHG | DIASTOLIC BLOOD PRESSURE: 80 MMHG | WEIGHT: 123.6 LBS | HEIGHT: 58 IN

## 2021-05-17 DIAGNOSIS — Z01.419 ENCOUNTER FOR GYNECOLOGICAL EXAMINATION (GENERAL) (ROUTINE) WITHOUT ABNORMAL FINDINGS: ICD-10-CM

## 2021-05-17 DIAGNOSIS — Z12.31 ENCOUNTER FOR SCREENING MAMMOGRAM FOR MALIGNANT NEOPLASM OF BREAST: Primary | ICD-10-CM

## 2021-05-17 PROCEDURE — G0145 SCR C/V CYTO,THINLAYER,RESCR: HCPCS | Performed by: OBSTETRICS & GYNECOLOGY

## 2021-05-17 PROCEDURE — G0101 CA SCREEN;PELVIC/BREAST EXAM: HCPCS | Performed by: OBSTETRICS & GYNECOLOGY

## 2021-05-17 NOTE — PROGRESS NOTES
Assessment/Plan: This 72-year-old patient seen for her gyn evaluation  She has no specific complaints at this time  No problem-specific Assessment & Plan notes found for this encounter  Subjective:      Patient ID: Karena Garber is a 78 y o  female  This 72-year-old patient has no chronic headaches or fainting spells or hot flashes  She has had no vaginal bleeding or vaginal infections over the past year  She does have a history of IBS but has not had any recent problems with diarrhea or constipation no blood with the bowels  She does not wear liners or pads routinely for urine stress incontinence  She has had no urinary tract infections over the past year  She did have a DEXA scan in 2020 which showed osteopenia  Her weight is 123 lb her blood pressure 122/80  Her last Pap smear was April of 2018 was normal   She did have a mammogram September 2020 which was normal           Review of Systems   Constitutional: Negative  HENT: Negative  Eyes: Negative  Respiratory: Negative  Cardiovascular: Negative  Gastrointestinal: Negative  Endocrine: Negative  Genitourinary: Negative  Musculoskeletal: Negative  Skin: Negative  Allergic/Immunologic: Negative  Neurological: Negative  Hematological: Negative  Psychiatric/Behavioral: Negative  Objective:      /80 (BP Location: Left arm, Patient Position: Sitting, Cuff Size: Standard)   Ht 4' 10" (1 473 m)   Wt 56 1 kg (123 lb 9 6 oz)   BMI 25 83 kg/m²          Physical Exam  Vitals signs and nursing note reviewed  Constitutional:       Appearance: Normal appearance  She is normal weight  HENT:      Head: Normocephalic  Nose: Nose normal       Mouth/Throat:      Mouth: Mucous membranes are moist    Eyes:      Extraocular Movements: Extraocular movements intact  Pupils: Pupils are equal, round, and reactive to light  Neck:      Musculoskeletal: Normal range of motion  Cardiovascular:      Rate and Rhythm: Normal rate and regular rhythm  Pulses: Normal pulses  Heart sounds: Normal heart sounds  Pulmonary:      Effort: Pulmonary effort is normal       Breath sounds: Normal breath sounds  Abdominal:      General: Abdomen is flat  Bowel sounds are normal       Palpations: Abdomen is soft  Genitourinary:     General: Normal vulva  Rectum: Normal       Comments:   Breast exam reveals no masses palpable breasts  There is no evidence of nipple inversion or discoloration of the skin of the breasts  Pelvic exam reveals the uterus to be anterior mobile normal size and well supported  No adnexal masses are present  The cervix shows a small polyp of the external cervical os  There is no blood or discharge in the vagina  The vulva is normal   Rectal exam shows no bladder masses in the rectum and no nodularity of the cul-de-sac  Musculoskeletal: Normal range of motion  Skin:     General: Skin is warm and dry  Neurological:      General: No focal deficit present  Mental Status: She is alert and oriented to person, place, and time     Psychiatric:         Mood and Affect: Mood normal          Behavior: Behavior normal

## 2021-05-17 NOTE — PATIENT INSTRUCTIONS
This ovary 4-0 patient was told of breast and pelvic exam are normal except for a small polyp at the external cervical os  I  Will rely on the Pap smear to see if any further evaluation is necessary  If the Pap is normal I will see her next year to recheck this area  She will call immediately if she sees any vaginal bleeding

## 2021-05-19 ENCOUNTER — OFFICE VISIT (OUTPATIENT)
Dept: PHYSICAL THERAPY | Facility: REHABILITATION | Age: 76
End: 2021-05-19
Payer: MEDICARE

## 2021-05-19 DIAGNOSIS — M79.604 PAIN OF RIGHT LOWER EXTREMITY: Primary | ICD-10-CM

## 2021-05-19 DIAGNOSIS — M54.16 RADICULOPATHY, LUMBAR REGION: ICD-10-CM

## 2021-05-19 PROCEDURE — 97110 THERAPEUTIC EXERCISES: CPT | Performed by: PHYSICAL THERAPIST

## 2021-05-19 PROCEDURE — 97112 NEUROMUSCULAR REEDUCATION: CPT | Performed by: PHYSICAL THERAPIST

## 2021-05-19 NOTE — PROGRESS NOTES
Daily Note     Today's date: 2021  Patient name: Corinne Shy  :   MRN: [id number]  Referring provider: Rosalinda Sweet MD  Dx:   Encounter Diagnosis     ICD-10-CM    1  Pain of right lower extremity  M79 604    2  Radiculopathy, lumbar region  M54 16                   Subjective: I feel that sometimes I am doing better and sometimes I am doing worse  Sometimes after I walk I feel great, I noticed this after my last PT session when I went home and walked  But then the next day I walked and 2 blocks from home I was having difficulty due to pain  Objective: See treatment diary below      Assessment: Introduced some hip stability and core progressions today  The patient has evident weakness more so on her right lower extremity that she reported she felt as well when she was performing new exercises  Progressed to a staggered bridge as well to emphasize right lower extremity activation  She did have some fatiguing of hamstrings rather than glutes and required verbal cueing to keep glutes engaged  Also educated patient on walking independently and to pay attention to any trends in regards to feeling discomfort after this activity  Plan: Continue per plan of care        Precautions: History of Lymphoma       Manuals 4/27 5/3 5/10 5/19         Lumbar Flexion Mobs                                                    Neuro Re-Ed             Common Fib Nerve Glides  20x 20x manual 20x manual np         Bridges   3x10  3x10 staggered (R focus) 3x10         TrA supine Marches   30"x3 2x15 3" Np, resume nv w/ band resistance         Sidesteps - band at forefoot (feet in ER)     4 laps ytb         Standing Resisted Marches - PBall Press    ytb 3x10          Clamshells    2x15 ytb B         Leg Press     3x10 70#         Ther Ex             Nustep  12' L4 10' L4 12' L5         Flexion in Sitting  15x 15x5" 15x5" HEP         Double Knee To Chest  20x 20x5"  20x5" HEP         Lumbar Rollouts   Flexion 10x5" 20x5"          Lower Trunk Rotation   20x5"  20x5" HEP         Standing Prostretch   2x10 2" 2x10          Emogene Juniper Pose     15x5"                       Ther Activity                                       Gait Training                                       Modalities

## 2021-05-20 LAB
LAB AP GYN PRIMARY INTERPRETATION: NORMAL
Lab: NORMAL

## 2021-05-24 ENCOUNTER — TRANSCRIBE ORDERS (OUTPATIENT)
Dept: PAIN MEDICINE | Facility: CLINIC | Age: 76
End: 2021-05-24

## 2021-05-24 ENCOUNTER — CONSULT (OUTPATIENT)
Dept: PAIN MEDICINE | Facility: CLINIC | Age: 76
End: 2021-05-24
Payer: MEDICARE

## 2021-05-24 VITALS
HEART RATE: 74 BPM | WEIGHT: 123 LBS | BODY MASS INDEX: 25.71 KG/M2 | SYSTOLIC BLOOD PRESSURE: 176 MMHG | DIASTOLIC BLOOD PRESSURE: 84 MMHG

## 2021-05-24 DIAGNOSIS — M54.16 RADICULOPATHY, LUMBAR REGION: ICD-10-CM

## 2021-05-24 DIAGNOSIS — M79.604 PAIN OF RIGHT LOWER EXTREMITY: ICD-10-CM

## 2021-05-24 PROCEDURE — 99204 OFFICE O/P NEW MOD 45 MIN: CPT | Performed by: ANESTHESIOLOGY

## 2021-05-24 NOTE — PATIENT INSTRUCTIONS

## 2021-05-27 ENCOUNTER — OFFICE VISIT (OUTPATIENT)
Dept: PHYSICAL THERAPY | Facility: REHABILITATION | Age: 76
End: 2021-05-27
Payer: MEDICARE

## 2021-05-27 DIAGNOSIS — M54.16 RADICULOPATHY, LUMBAR REGION: ICD-10-CM

## 2021-05-27 DIAGNOSIS — M79.604 PAIN OF RIGHT LOWER EXTREMITY: Primary | ICD-10-CM

## 2021-05-27 PROCEDURE — 97112 NEUROMUSCULAR REEDUCATION: CPT

## 2021-05-27 PROCEDURE — 97110 THERAPEUTIC EXERCISES: CPT

## 2021-05-27 NOTE — PROGRESS NOTES
Daily Note     Today's date: 2021  Patient name: Constantin Barton  : [id number]  MRN: [id number]  Referring provider: Yesica Garcia MD  Dx:   Encounter Diagnosis     ICD-10-CM    1  Pain of right lower extremity  M79 604    2  Radiculopathy, lumbar region  M54 16                   Subjective: Upon presentation patient denies radicular pains in shin  Reports mild pain on R side of low back in WB and increased discomfort in unilateral stance during WS  Patient had pain management appointment on , MD requested for MRI, which patient scheduled for   Objective: See treatment diary below       Assessment: Patient tolerated treatment well, able to complete outlined program without exacerbation of sx  Patient demonstrating notable weakness on R side, decreased posterior chain activation also noted  Improvement with staggered bridges, continued emphasis on glute activation and decreased hamstring recruitment  Occasional VC provided for the facilitation of proper form and dosage, emphasis on core bracing t/o with good carryover noted  Patient demonstrates fatigue post session, would benefit from continued PT to centralize radicular sx, improve core strength, and maximize overall function  Consider addition of reflexive core TE NV  Plan: Continue per plan of care        Precautions: History of Lymphoma       Manuals 4/27 5/3 5/10 5/19 5/27        Lumbar Flexion Mobs                                                    Neuro Re-Ed             Common Fib Nerve Glides  20x 20x manual 20x manual np np        Bridges   3x10  3x10 staggered (R focus) 3x10 3x10 staggered (R focus)        TrA supine Marches   30"x3 2x15 3" Np, resume nv w/ band resistance 2x15 3" YTB        Sidesteps - band at forefoot (feet in ER)     4 laps ytb 4 laps YTB        Standing Resisted Marches - PBall Press    ytb 3x10  ytb 3x10        Clamshells    2x15 ytb B 2x15 ytn b/l        Leg Press     3x10 70#         Ther Ex             Nustep  12' L4 10' L4 12' L5 12' L5        Flexion in Sitting  15x 15x5" 15x5" HEP         Double Knee To Chest  20x 20x5"  20x5" HEP         Lumbar Rollouts   Flexion 10x5" 20x5"  20x 5"        Lower Trunk Rotation   20x5"  20x5" HEP         Standing Prostretch   2x10 2" 2x10  2" 2x10        Parvin Pose     15x5"                       Ther Activity                                       Gait Training                                       Modalities

## 2021-06-02 ENCOUNTER — OFFICE VISIT (OUTPATIENT)
Dept: PHYSICAL THERAPY | Facility: REHABILITATION | Age: 76
End: 2021-06-02
Payer: MEDICARE

## 2021-06-02 DIAGNOSIS — M54.16 RADICULOPATHY, LUMBAR REGION: ICD-10-CM

## 2021-06-02 DIAGNOSIS — M79.604 PAIN OF RIGHT LOWER EXTREMITY: Primary | ICD-10-CM

## 2021-06-02 PROCEDURE — 97112 NEUROMUSCULAR REEDUCATION: CPT

## 2021-06-02 PROCEDURE — 97110 THERAPEUTIC EXERCISES: CPT

## 2021-06-02 NOTE — PROGRESS NOTES
Daily Note     Today's date: 2021  Patient name: Preston Dent  :   MRN: [id number]  Referring provider: Evelin Hargrove MD  Dx:   Encounter Diagnosis     ICD-10-CM    1  Pain of right lower extremity  M79 604    2  Radiculopathy, lumbar region  M54 16                   Subjective: Pt reports her R LB has been feeling fine, but her R leg still bothers her  Denies any radicular symptoms in RLE prior to start of session today  Notes the day after her LV she also had increased L knee pain  Has been taking Motrin to manage L knee pain, as well as performing about half her HEP, in effort to not further aggravate symptoms of L knee  States she also usually goes for a mile walk ever day, but d/t L knee pain, had stopped for several days, only just recently starting these walks again this past Monday  Yesterday's walk went better with less RLE radiculopathy, compared to on Monday  Objective: See treatment diary below      Assessment: Tolerated treatment well  Verbal cues required for proper glute activation with bridges, in effort to correct form and reduce use of hamstring  Was able to perform all exercise with no reproduction of RLE radiculopathy or L knee pain  Visible signs of fatigue during last several reps with ARNALDO pretty noted  Patient would benefit from continued PT to further improve strength, reduce pain/frequency of symptoms, and maximize overall function  Plan: Continue per plan of care         Precautions: History of Lymphoma       Manuals  5/3 5/10 5/19 5/27 6/2       Lumbar Flexion Mobs                                                    Neuro Re-Ed             Common Fib Nerve Glides  20x 20x manual 20x manual np np np       Bridges   3x10  3x10 staggered (R focus) 3x10 3x10 staggered (R focus) staggered (R focus) 3x10       TrA supine Marches   30"x3 2x15 3" Np, resume nv w/ band resistance 2x15 3" YTB 2x15 3"   YTB       Sidesteps - band at forefoot (feet in ER) 4 laps ytb 4 laps YTB 4 laps YTB       Standing Resisted Marches - PBall Press    ytb 3x10  ytb 3x10 ytb 3x10       Clamshells    2x15 ytb B 2x15 ytb b/l 2x15 ytb b/l       Leg Press     3x10 70#  3x10 70#       Ther Ex             Nustep  12' L4 10' L4 12' L5 12' L5 12' L5       Flexion in Sitting  15x 15x5" 15x5" HEP         Double Knee To Chest  20x 20x5"  20x5" HEP         Lumbar Rollouts   Flexion 10x5" 20x5"  20x 5" 20x 5"       Lower Trunk Rotation   20x5"  20x5" HEP         Standing Prostretch   2x10 2" 2x10  2" 2x10 2" 2x10       Parvin Pose     15x5"                       Ther Activity                                       Gait Training                                       Modalities

## 2021-06-08 ENCOUNTER — HOSPITAL ENCOUNTER (OUTPATIENT)
Dept: RADIOLOGY | Age: 76
Discharge: HOME | End: 2021-06-08
Payer: MEDICARE

## 2021-06-08 DIAGNOSIS — M54.16 RADICULOPATHY, LUMBAR REGION: ICD-10-CM

## 2021-06-08 PROCEDURE — 72148 MRI LUMBAR SPINE W/O DYE: CPT

## 2021-06-08 PROCEDURE — G1004 CDSM NDSC: HCPCS

## 2021-06-11 ENCOUNTER — TELEPHONE (OUTPATIENT)
Dept: RADIOLOGY | Facility: CLINIC | Age: 76
End: 2021-06-11

## 2021-06-11 DIAGNOSIS — M51.16 INTERVERTEBRAL DISC DISORDER WITH RADICULOPATHY OF LUMBAR REGION: Primary | ICD-10-CM

## 2021-06-11 NOTE — TELEPHONE ENCOUNTER
Discussed MRI lumbar spine results with patient which shows multilevel spondylosis and disc bulging with severe foraminal stenosis at L4-5  Recommend proceeding with right L4-5 transforaminal epidural steroid injection which she would like to try    Order placed

## 2021-06-14 ENCOUNTER — OFFICE VISIT (OUTPATIENT)
Dept: PHYSICAL THERAPY | Facility: REHABILITATION | Age: 76
End: 2021-06-14
Payer: MEDICARE

## 2021-06-14 DIAGNOSIS — M54.16 RADICULOPATHY, LUMBAR REGION: ICD-10-CM

## 2021-06-14 DIAGNOSIS — M79.604 PAIN OF RIGHT LOWER EXTREMITY: Primary | ICD-10-CM

## 2021-06-14 PROCEDURE — 97110 THERAPEUTIC EXERCISES: CPT | Performed by: PHYSICAL THERAPIST

## 2021-06-14 PROCEDURE — 97112 NEUROMUSCULAR REEDUCATION: CPT | Performed by: PHYSICAL THERAPIST

## 2021-06-14 NOTE — PROGRESS NOTES
Daily Note     Today's date: 2021  Patient name: Junior Newton  :   MRN: [id number]  Referring provider: Preston Quiñonez MD  Dx:   Encounter Diagnosis     ICD-10-CM    1  Pain of right lower extremity  M79 604    2  Radiculopathy, lumbar region  M54 16                   Subjective: left knee is still bothering me  It has not gotten any worse  I only notice the discomfort when walking down the stairs or if I try to bend my knee back very far  Other than that it is fine  In regards to my back and right leg, I am scheduled for injections with pain and spine towards the end of July  Objective: See treatment diary below      Assessment:  Discussed plan to taper out visits with 1 visit per 2 weeks leading up to patient's injections with pain management  Focused on progression of exercises today to further challenge patient and also progressed home exercise program  Did require moderate cueing for all new exercises to have correct form and engagement with exercises  Was moderately fatigued by end of session but did not have any worsening of symptoms  Would continue to benefit from skilled physical therapy to address current deficits, improve quality of life, and restore PLOF  Plan: Continue per plan of care        Precautions: History of Lymphoma       Manuals 4/27 5/3 5/10 5/19 5/27 6/2 6/14      Lumbar Flexion Mobs                                                    Neuro Re-Ed             Common Fib Nerve Glides  20x 20x manual 20x manual np np np       Bridges   3x10  3x10 staggered (R focus) 3x10 3x10 staggered (R focus) staggered (R focus) 3x10 march 3x10      TrA supine Marches   30"x3 2x15 3" Np, resume nv w/ band resistance 2x15 3" YTB 2x15 3"   YTB HEP      Sidesteps - band at forefoot (feet in ER)     4 laps ytb 4 laps YTB 4 laps YTB np      Standing Resisted Marches - PBall Press    ytb 3x10  ytb 3x10 ytb 3x10 np      Clamshells    2x15 ytb B 2x15 ytb b/l 2x15 ytb b/l np Modified Side Plank        10x3" B      Pallof Press       2x10 btb B      Bird Dogs       3x10       Rack Carry - Single Arm (at side)             Leg Press     3x10 70#  3x10 70# np      Ther Ex             Nustep  12' L4 10' L4 12' L5 12' L5 12' L5 10' L6      Flexion in Sitting  15x 15x5" 15x5" HEP         Double Knee To Chest  20x 20x5"  20x5" HEP         Lumbar Rollouts   Flexion 10x5" 20x5"  20x 5" 20x 5" 20x 5"       Lower Trunk Rotation   20x5"  20x5" HEP         Standing Prostretch   2x10 2" 2x10  2" 2x10 2" 2x10 HEP      Parvin Pose     15x5"                       Ther Activity                                       Gait Training                                       Modalities

## 2021-06-14 NOTE — TELEPHONE ENCOUNTER
Scheduled pt for Right L4-5 TF SID for 7/20/21  Went over pre-procedure instructions below:  Nothing to eat or drink 1 hr prior to procedure  Need to arrange transportation  Proper clothing for procedure  If ill or placed on antibiotics please call to reschedule  Covid/travel/ and vaccine instructions

## 2021-06-28 ENCOUNTER — OFFICE VISIT (OUTPATIENT)
Dept: PHYSICAL THERAPY | Facility: REHABILITATION | Age: 76
End: 2021-06-28
Payer: MEDICARE

## 2021-06-28 DIAGNOSIS — M54.16 RADICULOPATHY, LUMBAR REGION: ICD-10-CM

## 2021-06-28 DIAGNOSIS — M79.604 PAIN OF RIGHT LOWER EXTREMITY: Primary | ICD-10-CM

## 2021-06-28 PROCEDURE — 97110 THERAPEUTIC EXERCISES: CPT | Performed by: PHYSICAL THERAPIST

## 2021-06-28 PROCEDURE — 97112 NEUROMUSCULAR REEDUCATION: CPT | Performed by: PHYSICAL THERAPIST

## 2021-06-28 NOTE — PROGRESS NOTES
Daily Note     Today's date: 2021  Patient name: Royer Belcher  :   MRN: [id number]  Referring provider: Lakshmi Roblero MD  Dx:   Encounter Diagnosis     ICD-10-CM    1  Pain of right lower extremity  M79 604    2  Radiculopathy, lumbar region  M54 16                   Subjective: Over the past week, this is the best I have felt in a long time  Objective: See treatment diary below      Assessment: Patient did well with progressions today to further challenge core stability and work towards progressive overload  Did fatigue with static lunge and needed hand support for this exercise  Was also having lack of stability with bird dog progression when performing crunch with the movement  Was given progressed HEP and will follow up in 3 weeks after patient returns from vacation  Would continue to benefit from PT  Plan: Continue per plan of care        Precautions: History of Lymphoma       Manuals 4/27 5/3 5/10 5/19 5/27 6/2 6/14 6/28     Lumbar Flexion Mobs                                                    Neuro Re-Ed             Common Fib Nerve Glides  20x 20x manual 20x manual np np np       Bridges   3x10  3x10 staggered (R focus) 3x10 3x10 staggered (R focus) staggered (R focus) 3x10 march 3x10 single leg 3x10     TrA supine Marches   30"x3 2x15 3" Np, resume nv w/ band resistance 2x15 3" YTB 2x15 3"   YTB HEP Leg ext w/ head lift 2x10 B     Sidesteps - band at forefoot (feet in ER)     4 laps ytb 4 laps YTB 4 laps YTB np      Standing Resisted Marches - PBall Press    ytb 3x10  ytb 3x10 ytb 3x10 np      Clamshells    2x15 ytb B 2x15 ytb b/l 2x15 ytb b/l np       Modified Side Plank        10x3" B 10x5" B     Pallof Press       2x10 btb B 2x12 btb B     Bird Dogs       3x10  3x10 with crunch in     [place] Re[place] Carry - Single Arm (at side)             Leg Press     3x10 70#  3x10 70# np      Ther Ex             Nustep  12' L4 10' L4 12' L5 12' L5 12' L5 10' L6 10' L7     Flexion in Sitting 15x 15x5" 15x5" HEP         Double Knee To Chest  20x 20x5"  20x5" HEP         Lumbar Rollouts   Flexion 10x5" 20x5"  20x 5" 20x 5" 20x 5"  20x5"      Lower Trunk Rotation   20x5"  20x5" HEP         Standing Prostretch   2x10 2" 2x10  2" 2x10 2" 2x10 HEP      Parvin Pose     15x5"                       Ther Activity                                       Gait Training                                       Modalities

## 2021-07-19 ENCOUNTER — OFFICE VISIT (OUTPATIENT)
Dept: PHYSICAL THERAPY | Facility: REHABILITATION | Age: 76
End: 2021-07-19
Payer: MEDICARE

## 2021-07-19 DIAGNOSIS — M54.16 RADICULOPATHY, LUMBAR REGION: ICD-10-CM

## 2021-07-19 DIAGNOSIS — M79.604 PAIN OF RIGHT LOWER EXTREMITY: Primary | ICD-10-CM

## 2021-07-19 PROCEDURE — 97110 THERAPEUTIC EXERCISES: CPT | Performed by: PHYSICAL THERAPIST

## 2021-07-19 NOTE — PROGRESS NOTES
PT Discharge    Today's date: 2021  Patient name: Ana Rosa Crow  :   MRN: [id number]  Referring provider: Akbar Reynolds MD  Dx:   Encounter Diagnosis     ICD-10-CM    1  Pain of right lower extremity  M79 604    2  Radiculopathy, lumbar region  M54 16                   Subjective: I feel comfortable with the exercises I have been doing to manage my symptoms  I feel at this point I can do everything on my own at home  The pain is still there in some situations, but more manageable  I feel I can move around better and am stronger  Pain:   Current: 0   Best: 0   Worst: 4       Objective: See treatment diary below    Strength: Hip Abduction: 4/5   Hip Extension Knee Extended: 4/5   Hip Extension Knee Flexed: 4/5   Knee Extension: 5/5   Knee Flexion: 5/5     Lumbar Spine AROM: WNL in all planes     Red Flags: Unremarkable     Palpation: No tenderness         Assessment: The patient was able to make good progressions with physical therapy intervention  She does still have some right lower leg symptoms intermittently, but it does not impede her function, only if she attempts to walk further than one mile  At this time the patient has become independent with her HEP and managing her symptoms, so she will be discharged to an independent HEP  Goals  Impairment Based Goals: 9ALL MET)   Patient will improve FOTO score greater than predicted increase in 4 weeks  Patient will have reduced pain score of at least 50% in 4 weeks  Patient will improve strength by at least 1/2 MMT grade in 4 weeks  Functional based Goals: (upon discharge) (ALL MET)   Patient will be independent with home exercise program    Patient will be able to manage symptoms independently  Patient will be able to walk independently up to one mile without limitations from right lower leg pain  Patient will be able to stand for up to 30 minutes without limitations from right lower leg pain  Plan: D/C to HEP  Precautions: History of Lymphoma       Manuals 4/27 5/3 5/10 5/19 5/27 6/2 6/14 6/28 7/19    Lumbar Flexion Mobs                                                    Neuro Re-Ed             Common Fib Nerve Glides  20x 20x manual 20x manual np np np       Bridges   3x10  3x10 staggered (R focus) 3x10 3x10 staggered (R focus) staggered (R focus) 3x10 march 3x10 single leg 3x10     TrA supine Marches   30"x3 2x15 3" Np, resume nv w/ band resistance 2x15 3" YTB 2x15 3"   YTB HEP Leg ext w/ head lift 2x10 B     Sidesteps - band at forefoot (feet in ER)     4 laps ytb 4 laps YTB 4 laps YTB np      Standing Resisted Marches - PBall Press    ytb 3x10  ytb 3x10 ytb 3x10 np      Clamshells    2x15 ytb B 2x15 ytb b/l 2x15 ytb b/l np       Modified Side Plank        10x3" B 10x5" B     Pallof Press       2x10 btb B 2x12 btb B     Bird Dogs       3x10  3x10 with crunch in     [place] Carry - Single Arm (at side)             Leg Press     3x10 70#  3x10 70# np      Ther Ex             Nustep  12' L4 10' L4 12' L5 12' L5 12' L5 10' L6 10' L7     Flexion in Sitting  15x 15x5" 15x5" HEP         Double Knee To Chest  20x 20x5"  20x5" HEP         Lumbar Rollouts   Flexion 10x5" 20x5"  20x 5" 20x 5" 20x 5"  20x5"      Lower Trunk Rotation   20x5"  20x5" HEP         Standing Prostretch   2x10 2" 2x10  2" 2x10 2" 2x10 HEP      Parvin Pose     15x5"          HEP and Discharge Education         SE 15'     Ther Activity                                       Gait Training                                       Modalities

## 2021-07-20 ENCOUNTER — HOSPITAL ENCOUNTER (OUTPATIENT)
Dept: RADIOLOGY | Facility: CLINIC | Age: 76
Discharge: HOME | End: 2021-07-20
Attending: ANESTHESIOLOGY | Admitting: ANESTHESIOLOGY
Payer: MEDICARE

## 2021-07-20 VITALS
RESPIRATION RATE: 20 BRPM | HEART RATE: 58 BPM | SYSTOLIC BLOOD PRESSURE: 143 MMHG | OXYGEN SATURATION: 98 % | DIASTOLIC BLOOD PRESSURE: 78 MMHG | TEMPERATURE: 98.1 F

## 2021-07-20 DIAGNOSIS — M51.16 INTERVERTEBRAL DISC DISORDER WITH RADICULOPATHY OF LUMBAR REGION: ICD-10-CM

## 2021-07-20 PROCEDURE — 64483 NJX AA&/STRD TFRM EPI L/S 1: CPT | Performed by: ANESTHESIOLOGY

## 2021-07-20 PROCEDURE — 64484 NJX AA&/STRD TFRM EPI L/S EA: CPT | Performed by: ANESTHESIOLOGY

## 2021-07-20 RX ORDER — BUPIVACAINE HCL/PF 2.5 MG/ML
10 VIAL (ML) INJECTION ONCE
Status: COMPLETED | OUTPATIENT
Start: 2021-07-20 | End: 2021-07-20

## 2021-07-20 RX ORDER — METHYLPREDNISOLONE ACETATE 80 MG/ML
80 INJECTION, SUSPENSION INTRA-ARTICULAR; INTRALESIONAL; INTRAMUSCULAR; PARENTERAL; SOFT TISSUE ONCE
Status: COMPLETED | OUTPATIENT
Start: 2021-07-20 | End: 2021-07-20

## 2021-07-20 RX ORDER — 0.9 % SODIUM CHLORIDE 0.9 %
10 VIAL (ML) INJECTION ONCE
Status: COMPLETED | OUTPATIENT
Start: 2021-07-20 | End: 2021-07-20

## 2021-07-20 RX ADMIN — BUPIVACAINE HYDROCHLORIDE 2 ML: 2.5 INJECTION, SOLUTION EPIDURAL; INFILTRATION; INTRACAUDAL at 10:24

## 2021-07-20 RX ADMIN — METHYLPREDNISOLONE ACETATE 80 MG: 80 INJECTION, SUSPENSION INTRA-ARTICULAR; INTRALESIONAL; INTRAMUSCULAR; PARENTERAL; SOFT TISSUE at 10:24

## 2021-07-20 RX ADMIN — Medication 4 ML: at 10:20

## 2021-07-20 RX ADMIN — IOHEXOL 1 ML: 300 INJECTION, SOLUTION INTRAVENOUS at 10:24

## 2021-07-20 RX ADMIN — SODIUM CHLORIDE 4 ML: 9 INJECTION, SOLUTION INTRAMUSCULAR; INTRAVENOUS; SUBCUTANEOUS at 10:20

## 2021-07-20 NOTE — DISCHARGE INSTR - LAB
Epidural Steroid Injection   WHAT YOU NEED TO KNOW:   An epidural steroid injection (SID) is a procedure to inject steroid medicine into the epidural space  The epidural space is between your spinal cord and vertebrae  Steroids reduce inflammation and fluid buildup in your spine that may be causing pain  You may be given pain medicine along with the steroids  ACTIVITY  · Do not drive or operate machinery today  · No strenuous activity today - bending, lifting, etc   · You may resume normal activites starting tomorrow - start slowly and as tolerated  · You may shower today, but no tub baths or hot tubs  · You may have numbness for several hours from the local anesthetic  Please use caution and common sense, especially with weight-bearing activities  CARE OF THE INJECTION SITE  · If you have soreness or pain, apply ice to the area today (20 minutes on/20 minutes off)  · Starting tomorrow, you may use warm, moist heat or ice if needed  · You may have an increase or change in your discomfort for 36-48 hours after your treatment  · Apply ice and continue with any pain medication you have been prescribed  · Notify [facility] if you have any of the following: redness, drainage, swelling, headache, stiff neck or fever above 100°F     SPECIAL INSTRUCTIONS  · Our office will contact you in approximately 7 days for a progress report  MEDICATIONS  · Continue to take all routine medications  · Our office may have instructed you to hold some medications  As no general anesthesia was used in today's procedure, you should not experience any side effects related to anesthesia  If you have a problem specifically related to your procedure, please call our office at [phone]  Problems not related to your procedure should be directed to your primary care physician

## 2021-07-20 NOTE — H&P
History of Present Illness: The patient is a 78 y o  female who presents with complaints of right lower back and leg pain secondary to spinal stenosis and is here today for right L4-5 transforaminal epidural steroid injection      Patient Active Problem List   Diagnosis    Generalized abdominal pain    IBS (irritable bowel syndrome)    T-cell lymphoma (HCC)       Past Medical History:   Diagnosis Date    Hyperlipidemia     Mild    Lung mass     Lymphoma ([address] )     Menopause     Uterine leiomyoma        Past Surgical History:   Procedure Laterality Date    COLONOSCOPY      complete    ENDOMETRIAL BIOPSY      without cervical dilation    LAPAROSCOPY      (diagnostic)    LEG SURGERY      back of the leg     TONSILLECTOMY      TUBAL LIGATION           Current Outpatient Medications:     Calcium 500 MG tablet, Take 2 tablets by mouth daily, Disp: , Rfl:     Cyanocobalamin (B-12) 1000 MCG CAPS, Take 1 capsule by mouth daily, Disp: , Rfl:     Multiple Vitamin (MULTI-VITAMIN DAILY) TABS, Take 1 tablet by mouth daily, Disp: , Rfl:     Omega-3 Fatty Acids (FISH OIL) 645 MG CAPS, Take 1 capsule by mouth daily, Disp: , Rfl:     Pyridoxine HCl (BL VITAMIN B-6 PO), Take 1 tablet by mouth daily, Disp: , Rfl:     Current Facility-Administered Medications:     bupivacaine (PF) (MARCAINE) 0 25 % injection 10 mL, 10 mL, Epidural, Once, Daniel Butt MD    iohexol (OMNIPAQUE) 300 mg/mL injection 50 mL, 50 mL, Epidural, Once, Daniel Butt MD    lidocaine (PF) (XYLOCAINE-MPF) 2 % injection 5 mL, 5 mL, Infiltration, Once, Daniel Butt MD    methylPREDNISolone acetate (DEPO-MEDROL) injection 80 mg, 80 mg, Epidural, Once, Daniel Butt MD    sodium chloride (PF) 0 9 % injection 10 mL, 10 mL, Infiltration, Once, Daniel Butt MD    No Known Allergies    Physical Exam:   Vitals:    07/20/21 0958   BP: 131/76   Pulse: (!) 51   Resp: 18   Temp: 98 1 °F (36 7 °C)   SpO2: 99%     General: Awake, Alert, Oriented x 3, Mood and affect appropriate  Respiratory: Respirations even and unlabored  Cardiovascular: Peripheral pulses intact; no edema  Musculoskeletal Exam:  Right lower back tenderness    ASA Score: 2    Patient/Chart Verification  Patient ID Verified: Verbal  Consents Confirmed: To be obtained in the Pre-Procedure area  Interval H&P(within 24 hr) Complete (required for Outpatients and Surgery Admit only): To be obtained in the Pre-Procedure area  Allergies Reviewed: Yes  Anticoag/NSAID held?: NA  Currently on antibiotics?: No    Assessment:   1   Intervertebral disc disorder with radiculopathy of lumbar region        Plan: Right L4-5 TF SID

## 2021-07-27 ENCOUNTER — TELEPHONE (OUTPATIENT)
Dept: PAIN MEDICINE | Facility: CLINIC | Age: 76
End: 2021-07-27

## 2021-07-27 NOTE — TELEPHONE ENCOUNTER
Level 3  Relief 50%    Pt wants to know if the pain gets worse before it gets better? Pt is asking

## 2021-07-29 NOTE — TELEPHONE ENCOUNTER
Pt aware takes a couple weeks for full effect  Advised her we will call axeles 8/3   She would like her cell phone called

## 2021-08-03 NOTE — TELEPHONE ENCOUNTER
Patient states that she is feeling better. Patient states that starts to feel less and less.    60% relief    Pain scale - 3/10

## 2021-08-09 ENCOUNTER — LAB REQUISITION (OUTPATIENT)
Dept: LAB | Facility: HOSPITAL | Age: 76
End: 2021-08-09
Payer: MEDICARE

## 2021-08-09 DIAGNOSIS — K57.30 DIVERTICULOSIS OF LARGE INTESTINE WITHOUT PERFORATION OR ABSCESS WITHOUT BLEEDING: ICD-10-CM

## 2021-08-09 DIAGNOSIS — Z12.11 ENCOUNTER FOR SCREENING FOR MALIGNANT NEOPLASM OF COLON: ICD-10-CM

## 2021-08-09 PROCEDURE — 88305 TISSUE EXAM BY PATHOLOGIST: CPT | Performed by: PATHOLOGY

## 2021-09-02 ENCOUNTER — HOSPITAL ENCOUNTER (OUTPATIENT)
Dept: RADIOLOGY | Age: 76
Discharge: HOME | End: 2021-09-02
Payer: MEDICARE

## 2021-09-02 VITALS — WEIGHT: 123 LBS | HEIGHT: 58 IN | BODY MASS INDEX: 25.82 KG/M2

## 2021-09-02 DIAGNOSIS — Z12.31 ENCOUNTER FOR SCREENING MAMMOGRAM FOR MALIGNANT NEOPLASM OF BREAST: ICD-10-CM

## 2021-09-02 PROCEDURE — 77067 SCR MAMMO BI INCL CAD: CPT

## 2021-09-02 PROCEDURE — 77063 BREAST TOMOSYNTHESIS BI: CPT

## 2022-03-02 ENCOUNTER — TELEPHONE (OUTPATIENT)
Dept: PAIN MEDICINE | Facility: CLINIC | Age: 77
End: 2022-03-02

## 2022-03-02 NOTE — TELEPHONE ENCOUNTER
S/w pt, states she is having tingling and numbness in RLE, symptoms are the same as what she had experienced prior to R L4-5 TFESI last performed on 7/20  Pt reports 7+ months relief, she would like to repeat injection if recommended  Please advise, thank you

## 2022-03-02 NOTE — TELEPHONE ENCOUNTER
Patient states her right leg pain & foot numbness is returning & would like to discuss her next plan of care   Please advise, evelyn    Call back# [phone]

## 2022-03-25 ENCOUNTER — HOSPITAL ENCOUNTER (OUTPATIENT)
Dept: RADIOLOGY | Facility: CLINIC | Age: 77
Discharge: HOME | End: 2022-03-25
Attending: ANESTHESIOLOGY | Admitting: ANESTHESIOLOGY
Payer: MEDICARE

## 2022-03-25 VITALS
HEART RATE: 71 BPM | SYSTOLIC BLOOD PRESSURE: 157 MMHG | OXYGEN SATURATION: 96 % | DIASTOLIC BLOOD PRESSURE: 82 MMHG | TEMPERATURE: 98 F | RESPIRATION RATE: 18 BRPM

## 2022-03-25 DIAGNOSIS — M51.16 LUMBAR DISC DISEASE WITH RADICULOPATHY: ICD-10-CM

## 2022-03-25 PROCEDURE — 64484 NJX AA&/STRD TFRM EPI L/S EA: CPT | Performed by: ANESTHESIOLOGY

## 2022-03-25 PROCEDURE — 64483 NJX AA&/STRD TFRM EPI L/S 1: CPT | Performed by: ANESTHESIOLOGY

## 2022-03-25 RX ORDER — METHYLPREDNISOLONE ACETATE 80 MG/ML
80 INJECTION, SUSPENSION INTRA-ARTICULAR; INTRALESIONAL; INTRAMUSCULAR; PARENTERAL; SOFT TISSUE ONCE
Status: COMPLETED | OUTPATIENT
Start: 2022-03-25 | End: 2022-03-25

## 2022-03-25 RX ORDER — BUPIVACAINE HCL/PF 2.5 MG/ML
2 VIAL (ML) INJECTION ONCE
Status: COMPLETED | OUTPATIENT
Start: 2022-03-25 | End: 2022-03-25

## 2022-03-25 RX ORDER — 0.9 % SODIUM CHLORIDE 0.9 %
4 VIAL (ML) INJECTION ONCE
Status: COMPLETED | OUTPATIENT
Start: 2022-03-25 | End: 2022-03-25

## 2022-03-25 RX ADMIN — IOHEXOL 1 ML: 300 INJECTION, SOLUTION INTRAVENOUS at 09:48

## 2022-03-25 RX ADMIN — Medication 4 ML: at 09:45

## 2022-03-25 RX ADMIN — METHYLPREDNISOLONE ACETATE 80 MG: 80 INJECTION, SUSPENSION INTRA-ARTICULAR; INTRALESIONAL; INTRAMUSCULAR; PARENTERAL; SOFT TISSUE at 09:49

## 2022-03-25 RX ADMIN — BUPIVACAINE HYDROCHLORIDE 2 ML: 2.5 INJECTION, SOLUTION EPIDURAL; INFILTRATION; INTRACAUDAL at 09:49

## 2022-03-25 NOTE — DISCHARGE INSTR - LAB
Epidural Steroid Injection   WHAT YOU NEED TO KNOW:   An epidural steroid injection (SID) is a procedure to inject steroid medicine into the epidural space  The epidural space is between your spinal cord and vertebrae  Steroids reduce inflammation and fluid buildup in your spine that may be causing pain  You may be given pain medicine along with the steroids  ACTIVITY  Do not drive or operate machinery today  No strenuous activity today - bending, lifting, etc   You may resume normal activites starting tomorrow - start slowly and as tolerated  You may shower today, but no tub baths or hot tubs  You may have numbness for several hours from the local anesthetic  Please use caution and common sense, especially with weight-bearing activities  CARE OF THE INJECTION SITE  If you have soreness or pain, apply ice to the area today (20 minutes on/20 minutes off)  Starting tomorrow, you may use warm, moist heat or ice if needed  You may have an increase or change in your discomfort for 36-48 hours after your treatment  Apply ice and continue with any pain medication you have been prescribed  Notify [facility] if you have any of the following: redness, drainage, swelling, headache, stiff neck or fever above 100°F     SPECIAL INSTRUCTIONS  Our office will contact you in approximately 7 days for a progress report  MEDICATIONS  Continue to take all routine medications  Our office may have instructed you to hold some medications  As no general anesthesia was used in today's procedure, you should not experience any side effects related to anesthesia  If you have a problem specifically related to your procedure, please call our office at [phone]  Problems not related to your procedure should be directed to your primary care physician

## 2022-03-28 ENCOUNTER — OFFICE VISIT (OUTPATIENT)
Dept: INTERNAL MEDICINE CLINIC | Facility: CLINIC | Age: 77
End: 2022-03-28
Payer: MEDICARE

## 2022-03-28 VITALS
BODY MASS INDEX: 25.4 KG/M2 | HEIGHT: 58 IN | WEIGHT: 121 LBS | OXYGEN SATURATION: 100 % | HEART RATE: 64 BPM | TEMPERATURE: 97.8 F | DIASTOLIC BLOOD PRESSURE: 70 MMHG | SYSTOLIC BLOOD PRESSURE: 120 MMHG

## 2022-03-28 DIAGNOSIS — Z78.0 POSTMENOPAUSAL: ICD-10-CM

## 2022-03-28 DIAGNOSIS — C85.90 T-CELL LYMPHOMA (HCC): ICD-10-CM

## 2022-03-28 DIAGNOSIS — Z00.00 MEDICARE ANNUAL WELLNESS VISIT, SUBSEQUENT: Primary | ICD-10-CM

## 2022-03-28 DIAGNOSIS — E66.3 OVERWEIGHT (BMI 25.0-29.9): ICD-10-CM

## 2022-03-28 DIAGNOSIS — K58.9 IRRITABLE BOWEL SYNDROME WITHOUT DIARRHEA: ICD-10-CM

## 2022-03-28 DIAGNOSIS — R53.83 FATIGUE, UNSPECIFIED TYPE: ICD-10-CM

## 2022-03-28 PROCEDURE — 1123F ACP DISCUSS/DSCN MKR DOCD: CPT | Performed by: INTERNAL MEDICINE

## 2022-03-28 PROCEDURE — G0439 PPPS, SUBSEQ VISIT: HCPCS | Performed by: INTERNAL MEDICINE

## 2022-03-28 NOTE — PROGRESS NOTES
Assessment and Plan:     Problem List Items Addressed This Visit     None        BMI Counseling: Body mass index is 25 29 kg/m²  The BMI is above normal  Nutrition recommendations include decreasing portion sizes, encouraging healthy choices of fruits and vegetables, decreasing fast food intake, consuming healthier snacks, limiting drinks that contain sugar, moderation in carbohydrate intake, increasing intake of lean protein, reducing intake of saturated and trans fat and reducing intake of cholesterol  Exercise recommendations include moderate physical activity 150 minutes/week  No pharmacotherapy was ordered  Rationale for BMI follow-up plan is due to patient being overweight or obese  Depression Screening and Follow-up Plan: Patient was screened for depression during today's encounter  They screened negative with a PHQ-2 score of 0  Preventive health issues were discussed with patient, and age appropriate screening tests were ordered as noted in patient's After Visit Summary  Personalized health advice and appropriate referrals for health education or preventive services given if needed, as noted in patient's After Visit Summary       History of Present Illness:     Patient presents for Medicare Annual Wellness visit    Patient Care Team:  Jose Dunne MD as PCP - General  Rina Lowe MD     Problem List:     Patient Active Problem List   Diagnosis    Generalized abdominal pain    IBS (irritable bowel syndrome)    T-cell lymphoma ([facility] [address] )    Intervertebral disc disorder with radiculopathy of lumbar region      Past Medical and Surgical History:     Past Medical History:   Diagnosis Date    Hyperlipidemia     Mild    Lung mass     Lymphoma ([address] )     Menopause     Uterine leiomyoma      Past Surgical History:   Procedure Laterality Date    COLONOSCOPY      complete    ENDOMETRIAL BIOPSY      without cervical dilation    LAPAROSCOPY      (diagnostic)    LEG SURGERY      back of the leg     TONSILLECTOMY      TUBAL LIGATION        Family History:     Family History   Problem Relation Age of Onset    Dementia Mother     Cancer Maternal Aunt     Breast cancer Other     Heart disease Father     No Known Problems Daughter     No Known Problems Maternal Grandmother     Leukemia Maternal Grandfather     No Known Problems Paternal Grandmother     No Known Problems Paternal Grandfather     Breast cancer additional onset Cousin 29    No Known Problems Paternal Aunt     No Known Problems Paternal Aunt     No Known Problems Paternal Aunt       Social History:     Social History     Socioeconomic History    Marital status: /Civil Union     Spouse name: None    Number of children: None    Years of education: None    Highest education level: None   Occupational History    Occupation: retired   Tobacco Use    Smoking status: Former Smoker     Years: 10 00    Smokeless tobacco: Never Used    Tobacco comment: 30 years since last smoked    Vaping Use    Vaping Use: Never used   Substance and Sexual Activity    Alcohol use: Yes     Comment: Drinks hard liquor, drinks wine, seldom     Drug use: No    Sexual activity: Yes     Partners: Male   Other Topics Concern    None   Social History Narrative    Activities-Treadmill    Daily coffee consumption (1 cups/day)    Exercise: walking    Exercising regularly     Social Determinants of Health     Financial Resource Strain: Not on file   Food Insecurity: Not on file   Transportation Needs: Not on file   Physical Activity: Not on file   Stress: Not on file   Social Connections: Not on file   Intimate Partner Violence: Not on file   Housing Stability: Not on file      Medications and Allergies:     Current Outpatient Medications   Medication Sig Dispense Refill    Calcium 500 MG tablet Take 2 tablets by mouth daily 600mg       Cyanocobalamin (B-12) 1000 MCG CAPS Take 1 capsule by mouth daily      Multiple Vitamin (MULTI-VITAMIN DAILY) TABS Take 1 tablet by mouth daily      Omega-3 Fatty Acids (FISH OIL) 645 MG CAPS Take 1 capsule by mouth daily      Pyridoxine HCl (BL VITAMIN B-6 PO) Take 1 tablet by mouth daily       No current facility-administered medications for this visit  No Known Allergies   Immunizations:     Immunization History   Administered Date(s) Administered    COVID-19 PFIZER VACCINE 0 3 ML IM 03/05/2021, 03/26/2021, 10/01/2021    Influenza, seasonal, injectable 01/01/2014    Pneumococcal Conjugate 13-Valent 01/01/2016      Health Maintenance:         Topic Date Due    Breast Cancer Screening: Mammogram  09/02/2022    Colorectal Cancer Screening  08/09/2026    Hepatitis C Screening  Completed         Topic Date Due    DTaP,Tdap,and Td Vaccines (1 - Tdap) Never done    Pneumococcal Vaccine: 65+ Years (2 of 2 - PPSV23) 01/01/2017    Influenza Vaccine (1) 09/01/2021      Medicare Health Risk Assessment:     There were no vitals taken for this visit  Becky Quinn is here for her Subsequent Wellness visit  Last Medicare Wellness visit information reviewed, patient interviewed and updates made to the record today  Health Risk Assessment:   Patient rates overall health as very good  Patient feels that their physical health rating is same  Patient is satisfied with their life  Eyesight was rated as same  Hearing was rated as same  Patient feels that their emotional and mental health rating is same  Patients states they are never, rarely angry  Patient states they are sometimes unusually tired/fatigued  Pain experienced in the last 7 days has been none  Patient states that she has experienced no weight loss or gain in last 6 months  Depression Screening:   PHQ-2 Score: 0      Fall Risk Screening: In the past year, patient has experienced: no history of falling in past year      Urinary Incontinence Screening:   Patient has not leaked urine accidently in the last six months       Home Safety:  Patient does not have trouble with stairs inside or outside of their home  Patient has working smoke alarms and has working carbon monoxide detector  Home safety hazards include: none  Nutrition:   Current diet is Regular  Medications:   Patient is currently taking over-the-counter supplements  OTC medications include: see medication list  Patient is able to manage medications  Activities of Daily Living (ADLs)/Instrumental Activities of Daily Living (IADLs):   Walk and transfer into and out of bed and chair?: Yes  Dress and groom yourself?: Yes    Bathe or shower yourself?: Yes    Feed yourself? Yes  Do your laundry/housekeeping?: Yes  Manage your money, pay your bills and track your expenses?: Yes  Make your own meals?: Yes    Do your own shopping?: Yes    Previous Hospitalizations:   Any hospitalizations or ED visits within the last 12 months?: No      Advance Care Planning:   Living will: Yes    Advanced directive: Yes      Cognitive Screening:   Provider or family/friend/caregiver concerned regarding cognition?: No    PREVENTIVE SCREENINGS      Cardiovascular Screening:    General: Screening Current    Due for: Lipid Panel      Diabetes Screening:     General: Screening Current    Due for: Blood Glucose      Colorectal Cancer Screening:     General: Screening Current      Breast Cancer Screening:     General: Screening Current      Cervical Cancer Screening:    General: Screening Not Indicated      Abdominal Aortic Aneurysm (AAA) Screening:        General: Patient Declines      Lung Cancer Screening:     General: Screening Not Indicated      Hepatitis C Screening:    General: Screening Current    Screening, Brief Intervention, and Referral to Treatment (SBIRT)    Screening  Typical number of drinks in a day: 0  Typical number of drinks in a week: 0  Interpretation: Low risk drinking behavior      Single Item Drug Screening:  How often have you used an illegal drug (including marijuana) or a prescription medication for non-medical reasons in the past year? never    Single Item Drug Screen Score: 0  Interpretation: Negative screen for possible drug use disorder    Other Counseling Topics:   Car/seat belt/driving safety, skin self-exam, sunscreen and calcium and vitamin D intake         Holland Franco MD

## 2022-04-01 ENCOUNTER — TELEPHONE (OUTPATIENT)
Dept: PAIN MEDICINE | Facility: CLINIC | Age: 77
End: 2022-04-01

## 2022-04-05 LAB
ALBUMIN SERPL-MCNC: 4.3 G/DL (ref 3.6–5.1)
ALBUMIN/GLOB SERPL: 1.7 (CALC) (ref 1–2.5)
ALP SERPL-CCNC: 55 U/L (ref 37–153)
ALT SERPL-CCNC: 15 U/L (ref 6–29)
AST SERPL-CCNC: 19 U/L (ref 10–35)
BASOPHILS # BLD AUTO: 59 CELLS/UL (ref 0–200)
BASOPHILS NFR BLD AUTO: 0.7 %
BILIRUB SERPL-MCNC: 0.6 MG/DL (ref 0.2–1.2)
BUN SERPL-MCNC: 26 MG/DL (ref 7–25)
BUN/CREAT SERPL: 37 (CALC) (ref 6–22)
CALCIUM SERPL-MCNC: 9.8 MG/DL (ref 8.6–10.4)
CHLORIDE SERPL-SCNC: 103 MMOL/L (ref 98–110)
CHOLEST SERPL-MCNC: 237 MG/DL
CHOLEST/HDLC SERPL: 2.6 (CALC)
CO2 SERPL-SCNC: 32 MMOL/L (ref 20–32)
CREAT SERPL-MCNC: 0.71 MG/DL (ref 0.6–0.93)
EOSINOPHIL # BLD AUTO: 42 CELLS/UL (ref 15–500)
EOSINOPHIL NFR BLD AUTO: 0.5 %
ERYTHROCYTE [DISTWIDTH] IN BLOOD BY AUTOMATED COUNT: 13.1 % (ref 11–15)
GLOBULIN SER CALC-MCNC: 2.5 G/DL (CALC) (ref 1.9–3.7)
GLUCOSE SERPL-MCNC: 81 MG/DL (ref 65–99)
HCT VFR BLD AUTO: 43.8 % (ref 35–45)
HDLC SERPL-MCNC: 90 MG/DL
HGB BLD-MCNC: 14.7 G/DL (ref 11.7–15.5)
LDLC SERPL CALC-MCNC: 132 MG/DL (CALC)
LYMPHOCYTES # BLD AUTO: 2705 CELLS/UL (ref 850–3900)
LYMPHOCYTES NFR BLD AUTO: 32.2 %
MCH RBC QN AUTO: 29.8 PG (ref 27–33)
MCHC RBC AUTO-ENTMCNC: 33.6 G/DL (ref 32–36)
MCV RBC AUTO: 88.8 FL (ref 80–100)
MONOCYTES # BLD AUTO: 588 CELLS/UL (ref 200–950)
MONOCYTES NFR BLD AUTO: 7 %
NEUTROPHILS # BLD AUTO: 5006 CELLS/UL (ref 1500–7800)
NEUTROPHILS NFR BLD AUTO: 59.6 %
NONHDLC SERPL-MCNC: 147 MG/DL (CALC)
PLATELET # BLD AUTO: 250 THOUSAND/UL (ref 140–400)
PMV BLD REES-ECKER: 10.8 FL (ref 7.5–12.5)
POTASSIUM SERPL-SCNC: 4.2 MMOL/L (ref 3.5–5.3)
PROT SERPL-MCNC: 6.8 G/DL (ref 6.1–8.1)
RBC # BLD AUTO: 4.93 MILLION/UL (ref 3.8–5.1)
SL AMB EGFR AFRICAN AMERICAN: 97 ML/MIN/1.73M2
SL AMB EGFR NON AFRICAN AMERICAN: 84 ML/MIN/1.73M2
SODIUM SERPL-SCNC: 141 MMOL/L (ref 135–146)
TRIGL SERPL-MCNC: 63 MG/DL
TSH SERPL-ACNC: 2.7 MIU/L (ref 0.4–4.5)
WBC # BLD AUTO: 8.4 THOUSAND/UL (ref 3.8–10.8)

## 2022-04-07 NOTE — RESULT ENCOUNTER NOTE
Please call and let the patient know that her cholesterol levels are elevated, bad cholesterol is again increased  Advised low-fat diet    Rest of blood work okay

## 2022-04-11 ENCOUNTER — OFFICE VISIT (OUTPATIENT)
Dept: INTERNAL MEDICINE CLINIC | Facility: CLINIC | Age: 77
End: 2022-04-11
Payer: MEDICARE

## 2022-04-11 ENCOUNTER — TELEPHONE (OUTPATIENT)
Dept: INTERNAL MEDICINE CLINIC | Facility: CLINIC | Age: 77
End: 2022-04-11

## 2022-04-11 VITALS
TEMPERATURE: 97.5 F | HEIGHT: 58 IN | HEART RATE: 67 BPM | OXYGEN SATURATION: 98 % | SYSTOLIC BLOOD PRESSURE: 130 MMHG | BODY MASS INDEX: 25.4 KG/M2 | WEIGHT: 121 LBS | DIASTOLIC BLOOD PRESSURE: 80 MMHG

## 2022-04-11 DIAGNOSIS — H81.13 BENIGN PAROXYSMAL POSITIONAL VERTIGO DUE TO BILATERAL VESTIBULAR DISORDER: Primary | ICD-10-CM

## 2022-04-11 PROCEDURE — 99213 OFFICE O/P EST LOW 20 MIN: CPT | Performed by: INTERNAL MEDICINE

## 2022-04-11 RX ORDER — MECLIZINE HCL 12.5 MG/1
12.5 TABLET ORAL EVERY 8 HOURS PRN
Qty: 30 TABLET | Refills: 0 | Status: SHIPPED | OUTPATIENT
Start: 2022-04-11

## 2022-04-11 NOTE — TELEPHONE ENCOUNTER
Patient called was seen by Dr Clive Mccloud , she has been feeling gaurav when turning her head would like to discuss weather she would need to see a different kind of dr or is there something we could do  For her would like a call

## 2022-04-11 NOTE — PROGRESS NOTES
Assessment/Plan:    Diagnoses and all orders for this visit:    Benign paroxysmal positional vertigo due to bilateral vestibular disorder  Comments:  Meclizine every 8 hours as needed  Call if symptoms do not improve in next few days  Orders:  -     meclizine (ANTIVERT) 12 5 MG tablet; Take 1 tablet (12 5 mg total) by mouth every 8 (eight) hours as needed for dizziness  -     Ambulatory Referral to Physical Therapy; Future              There are no Patient Instructions on file for this visit  Subjective:      Patient ID: Estephania Dodd is a 78 y o  female    Patient complains of dizziness when laying to either side which is transient and resolves with lying on her since this morning    Denies tinnitus, gait abnormalities, headaches vision problems, head cold fever chills or nausea and vomiting        Current Outpatient Medications:     Calcium 500 MG tablet, Take 2 tablets by mouth daily 600mg , Disp: , Rfl:     Cyanocobalamin (B-12) 1000 MCG CAPS, Take 1 capsule by mouth daily, Disp: , Rfl:     Multiple Vitamin (MULTI-VITAMIN DAILY) TABS, Take 1 tablet by mouth daily, Disp: , Rfl:     Omega-3 Fatty Acids (FISH OIL) 645 MG CAPS, Take 1 capsule by mouth daily, Disp: , Rfl:     Pyridoxine HCl (BL VITAMIN B-6 PO), Take 1 tablet by mouth daily, Disp: , Rfl:     meclizine (ANTIVERT) 12 5 MG tablet, Take 1 tablet (12 5 mg total) by mouth every 8 (eight) hours as needed for dizziness, Disp: 30 tablet, Rfl: 0    Recent Results (from the past 1008 hour(s))   Lipid panel    Collection Time: 04/05/22  7:43 AM   Result Value Ref Range    Total Cholesterol 237 (H) <200 mg/dL    HDL 90 > OR = 50 mg/dL    Triglycerides 63 <150 mg/dL    LDL Calculated 132 (H) mg/dL (calc)    Chol HDLC Ratio 2 6 <5 0 (calc)    Non-HDL Cholesterol 147 (H) <130 mg/dL (calc)   Comprehensive metabolic panel    Collection Time: 04/05/22  7:43 AM   Result Value Ref Range    Glucose, Random 81 65 - 99 mg/dL    BUN 26 (H) 7 - 25 mg/dL Creatinine 0 71 0 60 - 0 93 mg/dL    eGFR Non  84 > OR = 60 mL/min/1 73m2    eGFR  97 > OR = 60 mL/min/1 73m2    SL AMB BUN/CREATININE RATIO 37 (H) 6 - 22 (calc)    Sodium 141 135 - 146 mmol/L    Potassium 4 2 3 5 - 5 3 mmol/L    Chloride 103 98 - 110 mmol/L    CO2 32 20 - 32 mmol/L    Calcium 9 8 8 6 - 10 4 mg/dL    Protein, Total 6 8 6 1 - 8 1 g/dL    Albumin 4 3 3 6 - 5 1 g/dL    Globulin 2 5 1 9 - 3 7 g/dL (calc)    Albumin/Globulin Ratio 1 7 1 0 - 2 5 (calc)    TOTAL BILIRUBIN 0 6 0 2 - 1 2 mg/dL    Alkaline Phosphatase 55 37 - 153 U/L    AST 19 10 - 35 U/L    ALT 15 6 - 29 U/L   CBC and differential    Collection Time: 04/05/22  7:43 AM   Result Value Ref Range    White Blood Cell Count 8 4 3 8 - 10 8 Thousand/uL    Red Blood Cell Count 4 93 3 80 - 5 10 Million/uL    Hemoglobin 14 7 11 7 - 15 5 g/dL    HCT 43 8 35 0 - 45 0 %    MCV 88 8 80 0 - 100 0 fL    MCH 29 8 27 0 - 33 0 pg    MCHC 33 6 32 0 - 36 0 g/dL    RDW 13 1 11 0 - 15 0 %    Platelet Count 716 140 - 400 Thousand/uL    SL AMB MPV 10 8 7 5 - 12 5 fL    Neutrophils (Absolute) 5,006 1,500 - 7,800 cells/uL    Lymphocytes (Absolute) 2,705 850 - 3,900 cells/uL    Monocytes (Absolute) 588 200 - 950 cells/uL    Eosinophils (Absolute) 42 15 - 500 cells/uL    Basophils ABS 59 0 - 200 cells/uL    Neutrophils 59 6 %    Lymphocytes 32 2 %    Monocytes 7 0 %    Eosinophils 0 5 %    Basophils PCT 0 7 %   TSH, 3rd generation    Collection Time: 04/05/22  7:43 AM   Result Value Ref Range    TSH 2 70 0 40 - 4 50 mIU/L       The following portions of the patient's history were reviewed and updated as appropriate: allergies, current medications, past family history, past medical history, past social history, past surgical history and problem list      Review of Systems   Constitutional: Negative for activity change, appetite change, chills, diaphoresis, fatigue, fever and unexpected weight change     HENT: Negative for congestion, drooling, ear discharge, ear pain, facial swelling, hearing loss, postnasal drip, rhinorrhea, sinus pressure, sinus pain, sneezing, sore throat, tinnitus, trouble swallowing and voice change  Eyes: Negative for discharge  Respiratory: Negative for apnea, cough, choking, chest tightness, shortness of breath, wheezing and stridor  Cardiovascular: Negative for chest pain, palpitations and leg swelling  Gastrointestinal: Negative for abdominal distention, abdominal pain, anal bleeding, blood in stool, constipation, diarrhea, nausea and vomiting  Genitourinary: Negative for decreased urine volume, difficulty urinating, frequency and urgency  Musculoskeletal: Negative for arthralgias, back pain and myalgias  Skin: Negative for color change  Neurological: Positive for dizziness  Negative for tremors, seizures, syncope, facial asymmetry, speech difficulty, weakness, light-headedness, numbness and headaches  Objective:      Vitals:    04/11/22 1600   BP: 130/80   Pulse: 67   Temp: 97 5 °F (36 4 °C)   SpO2: 98%          Physical Exam  Vitals reviewed  Constitutional:       General: She is not in acute distress  Appearance: Normal appearance  She is not ill-appearing, toxic-appearing or diaphoretic  HENT:      Mouth/Throat:      Mouth: Mucous membranes are moist    Cardiovascular:      Rate and Rhythm: Normal rate and regular rhythm  Pulses: Normal pulses  Heart sounds: Normal heart sounds  No murmur heard  No friction rub  No gallop  Pulmonary:      Effort: Pulmonary effort is normal  No respiratory distress  Breath sounds: Normal breath sounds  No stridor  No wheezing, rhonchi or rales  Chest:      Chest wall: No tenderness  Abdominal:      General: There is no distension  Palpations: Abdomen is soft  There is no mass  Tenderness: There is no abdominal tenderness  There is no rebound  Musculoskeletal:         General: No swelling or tenderness        Right lower leg: No edema  Left lower leg: No edema  Skin:     General: Skin is warm and dry  Findings: No lesion or rash  Neurological:      General: No focal deficit present  Mental Status: She is alert and oriented to person, place, and time  Cranial Nerves: Cranial nerves are intact  No facial asymmetry  Sensory: Sensation is intact  Motor: No weakness, tremor, atrophy, abnormal muscle tone, seizure activity or pronator drift  Coordination: Romberg sign negative  Finger-Nose-Finger Test normal       Gait: Gait is intact  Gait normal       Deep Tendon Reflexes:      Reflex Scores:       Tricep reflexes are 2+ on the right side and 2+ on the left side  Bicep reflexes are 2+ on the right side and 2+ on the left side  Patellar reflexes are 2+ on the right side and 2+ on the left side

## 2022-04-14 ENCOUNTER — TELEPHONE (OUTPATIENT)
Dept: INTERNAL MEDICINE CLINIC | Facility: CLINIC | Age: 77
End: 2022-04-14

## 2022-04-14 NOTE — TELEPHONE ENCOUNTER
Patient called to let you know she is feeling better, had to double the medications  Went to ENT and had her ears cleaned  Has her first rehab appt  Today

## 2022-06-13 ENCOUNTER — ANNUAL EXAM (OUTPATIENT)
Dept: OBGYN CLINIC | Facility: CLINIC | Age: 77
End: 2022-06-13
Payer: MEDICARE

## 2022-06-13 VITALS
BODY MASS INDEX: 26.32 KG/M2 | WEIGHT: 122 LBS | SYSTOLIC BLOOD PRESSURE: 130 MMHG | HEIGHT: 57 IN | DIASTOLIC BLOOD PRESSURE: 80 MMHG

## 2022-06-13 DIAGNOSIS — Z01.419 ROUTINE GYNECOLOGICAL EXAMINATION: Primary | ICD-10-CM

## 2022-06-13 DIAGNOSIS — Z12.31 ENCOUNTER FOR SCREENING MAMMOGRAM FOR BREAST CANCER: ICD-10-CM

## 2022-06-13 PROCEDURE — G0101 CA SCREEN;PELVIC/BREAST EXAM: HCPCS | Performed by: OBSTETRICS & GYNECOLOGY

## 2022-06-13 NOTE — PROGRESS NOTES
Elliott Hilton   [birth year]    CC:  Yearly exam    S:  78 y o  female here for yearly exam  She is postmenopausal and has had no vaginal bleeding  She denies vaginal discharge, itching, odor or dryness  Daughter and grandsons (12 and 23) are in [place] about two hours away  Does talk to daughter almost daily  Sexual activity: She is sexually active without pain, bleeding  She does not note dryness with intercourse  One partner  She does not report urinary incontinence, denies other urinary concerns, bowel problems, breast concerns  Last Pap: 5/17/21 - Normal Cytology  Last Mammo: 9/2/21 - Delpha Jie 1  Last Colonoscopy: 8/9/21 - 5yr recall   Last DEXA: 9/1/21 - low bone mineral density    We reviewed ASCCP guidelines for Pap testing       Family hx of breast cancer: no first degree relatives  Family hx of ovarian cancer: no  Family hx of colon cancer: no      Current Outpatient Medications:     Calcium 500 MG tablet, Take 2 tablets by mouth daily 600mg , Disp: , Rfl:     Cyanocobalamin (B-12) 1000 MCG CAPS, Take 1 capsule by mouth daily, Disp: , Rfl:     Multiple Vitamin (MULTI-VITAMIN DAILY) TABS, Take 1 tablet by mouth daily, Disp: , Rfl:     Omega-3 Fatty Acids (FISH OIL) 645 MG CAPS, Take 1 capsule by mouth daily, Disp: , Rfl:     Pyridoxine HCl (BL VITAMIN B-6 PO), Take 1 tablet by mouth daily, Disp: , Rfl:     meclizine (ANTIVERT) 12 5 MG tablet, Take 1 tablet (12 5 mg total) by mouth every 8 (eight) hours as needed for dizziness (Patient not taking: Reported on 6/13/2022), Disp: 30 tablet, Rfl: 0  Patient Active Problem List   Diagnosis    Generalized abdominal pain    IBS (irritable bowel syndrome)    T-cell lymphoma ([facility])    Intervertebral disc disorder with radiculopathy of lumbar region     Family History   Problem Relation Age of Onset    Dementia Mother     Cancer Maternal Aunt     Breast cancer Other     Heart disease Father     No Known Problems Daughter     No Known Problems Maternal Grandmother     Leukemia Maternal Grandfather     No Known Problems Paternal Grandmother     No Known Problems Paternal Grandfather     Breast cancer additional onset Cousin 29    No Known Problems Paternal Aunt     No Known Problems Paternal Aunt     No Known Problems Paternal Aunt      Past Medical History:   Diagnosis Date    Dizziness     Hyperlipidemia     Mild    Lung mass     Lymphoma ([address] )     Menopause     Uterine leiomyoma         Review of Systems   Respiratory: Negative  Cardiovascular: Negative  Gastrointestinal: Negative for constipation and diarrhea  Genitourinary: Negative for difficulty urinating, pelvic pain, vaginal bleeding, vaginal discharge, itching or odor  O:  Blood pressure 130/80, height 4' 8 5" (1 435 m), weight 55 3 kg (122 lb), not currently breastfeeding  Patient appears well and is not in distress  Breasts are symmetrical without mass, tenderness, nipple discharge, skin changes or adenopathy  Abdomen is soft and nontender without masses  External genitals are normal without lesions or rashes  Urethral meatus and urethra are normal  Bladder is normal to palpation  Vagina is normal without discharge or bleeding, generalized atrophic changes present   Cervix with small polyp noted at os, similar to prior exam noted  Uterus is normal, mobile, nontender without palpable mass  Adnexa are normal, nontender, without palpable mass  A:  Yearly exam      P:   Pap no longer indicated - discussed in detail with patient  Mammo up to date, ordered   Colon Cancer Screening completed   DEXA up to date, due 2023      Reviewed considerations of menopause, to call with any postmenopausal bleeding or other concerns  RTO one year for yearly exam or sooner as needed

## 2022-06-13 NOTE — PROGRESS NOTES
Patient's Lab: [facility]    Last Yearly: 5/17/21  Last Pap: 5/17/21  Results: -  Last Mammo Date: 9/2/21  Birads: 1-  Lifetime Risk Assessment: 5 09%  Next Mammo Scheduled: No  Script Needed: Yes  Colon: 8/9/21-5yr recall  Dexa: 9/1/21-low bone mineral density  Postmenopausal  VB: None  S/P Covid Shot: + Booster  Sexually Active: Yes      Family hx of breast cancer: No  Family hx of ovarian cancer: No  Family hx of colon cancer: No    Q's/Concerns: None

## 2022-06-15 ENCOUNTER — OFFICE VISIT (OUTPATIENT)
Dept: PAIN MEDICINE | Facility: CLINIC | Age: 77
End: 2022-06-15
Payer: MEDICARE

## 2022-06-15 VITALS
DIASTOLIC BLOOD PRESSURE: 92 MMHG | SYSTOLIC BLOOD PRESSURE: 174 MMHG | BODY MASS INDEX: 26.87 KG/M2 | WEIGHT: 122 LBS | HEART RATE: 86 BPM

## 2022-06-15 DIAGNOSIS — M51.16 INTERVERTEBRAL DISC DISORDER WITH RADICULOPATHY OF LUMBAR REGION: Primary | ICD-10-CM

## 2022-06-15 PROCEDURE — 99214 OFFICE O/P EST MOD 30 MIN: CPT | Performed by: ANESTHESIOLOGY

## 2022-06-15 RX ORDER — PREGABALIN 50 MG/1
50 CAPSULE ORAL 2 TIMES DAILY
Qty: 60 CAPSULE | Refills: 1 | Status: SHIPPED | OUTPATIENT
Start: 2022-06-15

## 2022-06-15 NOTE — PROGRESS NOTES
Assessment:  1  Intervertebral disc disorder with radiculopathy of lumbar region        Plan:  The patient is experiencing worsening symptoms in the right anterior lateral lower leg into the foot with numbness and tingling so at this time I will order updated MRI of the lumbar spine to evaluate for worsening stenosis  I advised her we will call with the results and discuss treatment moving forward  For now, I will start her on Lyrica 50 mg twice daily which he was instructed to take it at bedtime for 5 days before increasing to b i d  dosing  This is to help the neuropathic symptoms  She was apprised of the most common side effects including sleepiness and dizziness  My impressions and treatment recommendations were discussed in detail with the patient who verbalized understanding and had no further questions  Discharge instructions were provided  I personally saw and examined the patient and I agree with the above discussed plan of care  Orders Placed This Encounter   Procedures    MRI lumbar spine without contrast     Standing Status:   Future     Standing Expiration Date:   6/15/2026     Scheduling Instructions: There is no preparation for this test  Please leave your jewelry and valuables at home, wedding rings are the exception  Magnetic nail polish must be removed prior to arrival for your test  Please bring your insurance cards, a form of photo ID and a list of your medications with you  Arrive 15 minutes prior to your appointment time in order to register  Please bring any prior CT or MRI studies of this area that were not performed at a [facility]  To schedule this appointment, please contact Central Scheduling at [phone]  Prior to your appointment, please make sure you complete the MRI Screening Form when you e-Check in for your appointment  This will be available starting 7 days before your appointment in [address]   You may receive an e-mail with an activation code if you do not have a [employer] account  If you do not have access to a device, we will complete your screening at your appointment  Order Specific Question:   What is the patient's sedation requirement? Answer:   No Sedation     Order Specific Question:   Release to patient through [employer]     Answer:   Immediate     Order Specific Question:   Is order priority selected as STAT? Answer:   No     Order Specific Question:   Reason for Exam (FREE TEXT)     Answer:   worsening right leg pain     New Medications Ordered This Visit   Medications    pregabalin (LYRICA) 50 mg capsule     Sig: Take 1 capsule (50 mg total) by mouth 2 (two) times a day     Dispense:  60 capsule     Refill:  1         History of Present Illness:  Bhakti Pulliam is a 78 y o  female who presents for a follow up office visit in regards to Foot Pain (Right ankle/)  The patient has a history lumbosacral disc disorder with radiculopathy returns for follow-up  She reports worsening symptoms in her right lower leg into the ankle now with numbness and tingling extending into the foot  She reports minimal improvement after the last epidural steroid injection and feels overall worse than she did last year  She has difficulty standing or walking for any length of time  I have personally reviewed and/or updated the patient's past medical history, past surgical history, family history, social history, current medications, allergies, and vital signs today  Review of Systems   Respiratory: Negative for shortness of breath  Cardiovascular: Negative for chest pain  Gastrointestinal: Negative for constipation, diarrhea, nausea and vomiting  Musculoskeletal: Positive for joint swelling  Negative for arthralgias, gait problem and myalgias  Skin: Negative for rash  Neurological: Negative for dizziness, seizures and weakness  All other systems reviewed and are negative        Patient Active Problem List Diagnosis    Generalized abdominal pain    IBS (irritable bowel syndrome)    [facility]-cell [facility] ([facility] [address]    Intervertebral disc disorder with radiculopathy of lumbar region       Past Medical History:   Diagnosis Date    Dizziness     Hyperlipidemia     Mild    Lung mass     Lymphoma ([facility] [address] [facility]     Menopause     Uterine leiomyoma        Past Surgical History:   Procedure Laterality Date    COLONOSCOPY      complete    ENDOMETRIAL BIOPSY      without cervical dilation    LAPAROSCOPY      (diagnostic)    LEG SURGERY      back of the leg     TONSILLECTOMY      TUBAL LIGATION         Family History   Problem Relation Age of Onset    Dementia Mother     Cancer Maternal Aunt     Breast cancer Other     Heart disease Father     No Known Problems Daughter     No Known Problems Maternal Grandmother     Leukemia Maternal Grandfather     No Known Problems Paternal Grandmother     No Known Problems Paternal Grandfather     Breast cancer additional onset Cousin 29    No Known Problems Paternal Aunt     No Known Problems Paternal Aunt     No Known Problems Paternal Aunt        Social History     Occupational History    Occupation: retired   Tobacco Use    Smoking status: Former Smoker     Years: 10 00    Smokeless tobacco: Never Used    Tobacco comment: 30 years since last smoked    Vaping Use    Vaping Use: Never used   Substance and Sexual Activity    Alcohol use: Yes     Comment: Drinks hard liquor, drinks wine, seldom     Drug use: No    Sexual activity: Yes     Partners: Male     Birth control/protection: Post-menopausal       Current Outpatient Medications on File Prior to Visit   Medication Sig    Calcium 500 MG tablet Take 2 tablets by mouth daily 600mg     Cyanocobalamin (B-12) 1000 MCG CAPS Take 1 capsule by mouth daily    meclizine (ANTIVERT) 12 5 MG tablet Take 1 tablet (12 5 mg total) by mouth every 8 (eight) hours as needed for dizziness (Patient not taking: Reported on 6/13/2022)    Multiple Vitamin (MULTI-VITAMIN DAILY) TABS Take 1 tablet by mouth daily    Omega-3 Fatty Acids (FISH OIL) 645 MG CAPS Take 1 capsule by mouth daily    Pyridoxine HCl (BL VITAMIN B-6 PO) Take 1 tablet by mouth daily     No current facility-administered medications on file prior to visit  No Known Allergies    Physical Exam:    BP (!) 174/92   Pulse 86   Wt 55 3 kg (122 lb)   BMI 26 87 kg/m²     Constitutional:normal, well developed, well nourished, alert, in no distress and non-toxic and no overt pain behavior  Eyes:anicteric  HEENT:grossly intact  Neck:supple, symmetric, trachea midline and no masses   Pulmonary:even and unlabored  Cardiovascular:No edema or pitting edema present  Skin:Normal without rashes or lesions and well hydrated  Psychiatric:Mood and affect appropriate  Neurologic:Cranial Nerves II-XII grossly intact  Musculoskeletal:normal     Lumbar Spine Exam  Appearance:  Normal lordosis  Palpation/Tenderness:  no tenderness or spasm  Range of Motion:  Full range of motion with no pain or limitations in flexion, extension, lateral flexion and rotation  Motor Strength:  Left hip flexion:  5/5  Left hip extension:  5/5  Right hip flexion:  5/5  Right hip extension:  5/5  Left knee flexion:  5/5  Left knee extension:  5/5  Right knee flexion:  5/5  Right knee extension:  5/5  Left foot dorsiflexion:  5/5  Left foot plantar flexion:  5/5  Right foot dorsiflexion:  5/5  Right foot plantar flexion:  5/5    Imaging    MRI LUMBAR SPINE WITHOUT CONTRAST (6/8/2021)     INDICATION: M54 16: Radiculopathy, lumbar region      COMPARISON:  None      TECHNIQUE:  Sagittal T1, sagittal T2, sagittal inversion recovery, axial T1 and axial T2, coronal T2  Imaging performed on 3 0T MRI  IMAGE QUALITY:  Diagnostic     FINDINGS:     VERTEBRAL BODIES:  There are 5 lumbar type vertebral bodies  There is S-shaped thoracolumbar scoliosis    There is anterolisthesis of L5 over S1  Vertebral hemangioma seen in the T12 vertebra  Modic type II changes seen at L4-5, L3-4 and L5-S1     SACRUM:  Normal signal within the sacrum  No evidence of insufficiency or stress fracture      DISTAL CORD AND CONUS:  Normal size and signal within the distal cord and conus      PARASPINAL SOFT TISSUES:  T2 hyperintensity seen in the upper pole left kidney compatible with the cyst     LOWER THORACIC DISC SPACES:  Normal disc height and signal   No disc herniation, canal stenosis or foraminal narrowing      LUMBAR DISC SPACES:     L1-L2:  Appears unremarkable with no significant central canal narrowing of foraminal narrowing     L2-L3:  Degenerative disc disease seen with the disc bulge with left foraminal, extraforaminal protrusion with moderate to severe left foraminal narrowing with no significant right foraminal narrowing  There is facet joint disease, ligamentous   thickening, more pronounced on the left side  There is mild impingement of the exiting left L2 nerve root     L3-L4:  Demonstrates mild anterolisthesis of L3 over L4  There is a redundant disc with disc bulge  There is a small left foraminal protrusion with moderate left foraminal narrowing  There is facet joint disease  There is no significant central canal   narrowing     L4-L5:  Demonstrates degenerative disc disease with loss of disc height with Modic type II changes  There is disc bulge there is right foraminal and extraforaminal protrusion with severe right foraminal narrowing  There is no significant left foraminal   narrowing  There is no significant central canal narrowing there is mild impingement of the exiting right L4 nerve     L5-S1:  Bilateral pars defect seen in the L5  There is anterolisthesis of L5 over S1 with the redundant disc  There is broad-based disc bulge with severe bilateral foraminal narrowing, right greater than left    There is associated facet joint disease

## 2022-07-15 ENCOUNTER — HOSPITAL ENCOUNTER (OUTPATIENT)
Dept: RADIOLOGY | Age: 77
Discharge: HOME | End: 2022-07-15
Payer: MEDICARE

## 2022-07-15 DIAGNOSIS — M51.16 INTERVERTEBRAL DISC DISORDER WITH RADICULOPATHY OF LUMBAR REGION: ICD-10-CM

## 2022-07-15 PROCEDURE — 72148 MRI LUMBAR SPINE W/O DYE: CPT

## 2022-07-15 PROCEDURE — G1004 CDSM NDSC: HCPCS

## 2022-07-18 ENCOUNTER — TELEPHONE (OUTPATIENT)
Dept: PAIN MEDICINE | Facility: CLINIC | Age: 77
End: 2022-07-18

## 2022-07-18 DIAGNOSIS — M48.062 LUMBAR STENOSIS WITH NEUROGENIC CLAUDICATION: ICD-10-CM

## 2022-07-18 DIAGNOSIS — M51.16 INTERVERTEBRAL DISC DISORDER WITH RADICULOPATHY OF LUMBAR REGION: Primary | ICD-10-CM

## 2022-07-18 NOTE — TELEPHONE ENCOUNTER
Please let patient know that MRI lumbar spine shows moderate right-sided narrowing at L4-5 and severe right-sided narrowing at L5-S1 which is the etiology of ongoing pain complaints little change from the prior MRI    Since symptoms got worse after the last procedure, would recommend consultation with Dr Leona Garza of Neurosurgery

## 2022-07-29 NOTE — TELEPHONE ENCOUNTER
Pt will be coming in today to sign a SARAI form to have her records sent to another facility for a second opinion      Pt # [phone]

## 2022-08-12 ENCOUNTER — TELEPHONE (OUTPATIENT)
Dept: PAIN MEDICINE | Facility: CLINIC | Age: 77
End: 2022-08-12

## 2022-08-12 DIAGNOSIS — M51.16 INTERVERTEBRAL DISC DISORDER WITH RADICULOPATHY OF LUMBAR REGION: ICD-10-CM

## 2022-08-12 RX ORDER — PREGABALIN 50 MG/1
50 CAPSULE ORAL 2 TIMES DAILY
Qty: 60 CAPSULE | Refills: 1 | Status: SHIPPED | OUTPATIENT
Start: 2022-08-12 | End: 2022-10-10 | Stop reason: SDUPTHER

## 2022-08-12 NOTE — TELEPHONE ENCOUNTER
Patient called in for a refill of :  Name of medication: pregabalin (LYRICA) 50 mg capsule      Frequency: Take 1 capsule (50 mg total) by mouth 2 (two) times a day    How many left: 2 days worth    [facility]     Best call back #  [phone]    Pt aware it can take 24-48 hrs to process refills- thank you

## 2022-08-12 NOTE — TELEPHONE ENCOUNTER
RN s/w pt and she confirmed she is taking lyrica 50 mg twice a day  RN asked if she felt is was working she said in the beginning it really took the edge off but now she has some days the the pain is ok and other days the pain is bad  RN asked pt if she wanted me to ask for a dose increase and she said she will stay on the same amt for now b/c she is going to see the neurosurgeon on 8/26 and she'll see what he says  Pls send refill to [facility] on file today for pt  This medication was LP for #60 w/ 1 RF by FQ on 6/15/22  No need to c/b once RF sent per pt

## 2022-08-12 NOTE — TELEPHONE ENCOUNTER
Refill sent at her current dose, she can see with the neurosurgeon says and if they are willing to increase that is fine

## 2022-08-26 ENCOUNTER — CONSULT (OUTPATIENT)
Dept: NEUROSURGERY | Facility: CLINIC | Age: 77
End: 2022-08-26
Payer: MEDICARE

## 2022-08-26 ENCOUNTER — TELEPHONE (OUTPATIENT)
Dept: NEUROSURGERY | Facility: CLINIC | Age: 77
End: 2022-08-26

## 2022-08-26 VITALS
OXYGEN SATURATION: 96 % | BODY MASS INDEX: 26.24 KG/M2 | DIASTOLIC BLOOD PRESSURE: 86 MMHG | WEIGHT: 125 LBS | TEMPERATURE: 97.3 F | HEIGHT: 58 IN | HEART RATE: 56 BPM | SYSTOLIC BLOOD PRESSURE: 142 MMHG

## 2022-08-26 DIAGNOSIS — M48.062 LUMBAR STENOSIS WITH NEUROGENIC CLAUDICATION: ICD-10-CM

## 2022-08-26 DIAGNOSIS — M51.16 INTERVERTEBRAL DISC DISORDER WITH RADICULOPATHY OF LUMBAR REGION: ICD-10-CM

## 2022-08-26 PROCEDURE — 99203 OFFICE O/P NEW LOW 30 MIN: CPT | Performed by: NEUROLOGICAL SURGERY

## 2022-08-26 NOTE — TELEPHONE ENCOUNTER
Per Dr Farzana Helms, "She could try another injection as it was successful the 1st time but exacerbated her symptoms the 2nd time   Overall, given that the 2nd injection was not helpful, she should proceed with caution and discuss further with her pain specialist   Would recommend she 1st try to optimize pain medication "    Called patient back and made her aware of Dr Hadley Chavez recommendation  She was appreciative

## 2022-08-26 NOTE — PROGRESS NOTES
Office Note - Neurosurgery   Jhonny Hernandez 78 y o  female MRN: [id number]      Assessment:    Patient is stable  45-year-old woman with right leg pain which is likely neuropathic in nature in the setting of lumbar spondylosis and stenosis  Would recommend she exhausted nonsurgical pain managed strategies  She could consider increasing her dose of Lyrica  We also discussed appropriate use of over-the-counter pain medication to manage her pain  I encouraged her to remain active  I explained that surgical intervention would involve a relatively extensive lumbar decompression and fusion procedure given her underlying deformity  She is not interested in this option at present  She will continue to follow up with her pain specialist and PCP  I would be pleased to see her again should she have any additional questions  History, physical examination and diagnostic tests were reviewed and questions answered  Diagnosis, care plan and treatment options were discussed  The patient understand instructions and will follow up as directed  Plan:    Follow-up: prn    Problem List Items Addressed This Visit        Nervous and Auditory    Intervertebral disc disorder with radiculopathy of lumbar region      Other Visit Diagnoses     Lumbar stenosis with neurogenic claudication              Subjective/Objective     Chief Complaint    Right lateral shin pain  HPI    Pleasant 45-year-old woman with longstanding history of right lower leg pain  She initially had pain in the upper right outer calf and shin  She underwent epidural steroid injection which was quite helpful  The 2nd injection however resulted in increasing pain lower down in the shin  She describes a dysesthetic burning sensation which is constant  It will arrange anywhere from 2-7 to 8/10  It is at its worst approximately once a week  She denies any back pain    She denies any weakness in the leg but does have some numbness over her right great toe  She denies any pain, weakness or numbness in her left leg or difficulties with bowel bladder function or changing perineal sensation  Physical therapy was not helpful  Current pain medications are listed below and is difficult to tell if they are helpful  She presents today with an MRI of the lumbar spine  EVERETT FLOYD personally reviewed and updated  Review of Systems   Constitutional: Negative  HENT: Negative  Eyes: Negative  Respiratory: Negative  Cardiovascular: Negative  Gastrointestinal: Negative  Endocrine: Negative  Genitourinary: Negative  Musculoskeletal: Positive for arthralgias (pain right lower leg, not all the time, worse with walking; aching, burning in separate places in lower leg and foot) and gait problem (walking makes pain worse)  PT last year no help  PM- SID -first lasted 71/2 months, 2nd hit a nerve- pain worsened   Skin: Negative  Allergic/Immunologic: Negative  Neurological: Positive for numbness (first 2 toes of right foot)  Hematological: Negative  Psychiatric/Behavioral: Negative  All other systems reviewed and are negative        Family History    Family History   Problem Relation Age of Onset    Dementia Mother     Cancer Maternal Aunt     Breast cancer Other     Heart disease Father     No Known Problems Daughter     No Known Problems Maternal Grandmother     Leukemia Maternal Grandfather     No Known Problems Paternal Grandmother     No Known Problems Paternal Grandfather     Breast cancer additional onset Cousin 29    No Known Problems Paternal Aunt     No Known Problems Paternal Aunt     No Known Problems Paternal Aunt        Social History    Social History     Socioeconomic History    Marital status: /Civil Union     Spouse name: Not on file    Number of children: Not on file    Years of education: Not on file    Highest education level: Not on file   Occupational History    Occupation: retired   Tobacco Use    Smoking status: Former Smoker     Years: 10 00    Smokeless tobacco: Never Used    Tobacco comment: 30 years since last smoked    Vaping Use    Vaping Use: Never used   Substance and Sexual Activity    Alcohol use: Yes     Comment: Drinks hard liquor, drinks wine, seldom     Drug use: No    Sexual activity: Yes     Partners: Male     Birth control/protection: Post-menopausal   Other Topics Concern    Not on file   Social History Narrative    Activities-Treadmill    Daily coffee consumption (1 cups/day)    Exercise: walking    Exercising regularly     Social Determinants of Health     Financial Resource Strain: Not on file   Food Insecurity: Not on file   Transportation Needs: Not on file   Physical Activity: Not on file   Stress: Not on file   Social Connections: Not on file   Intimate Partner Violence: Not on file   Housing Stability: Not on file       Past Medical History    Past Medical History:   Diagnosis Date    Dizziness     Hyperlipidemia     Mild    Lung mass     Lymphoma ([facility] [address]     Menopause     Uterine leiomyoma        Surgical History    Past Surgical History:   Procedure Laterality Date    COLONOSCOPY      complete    ENDOMETRIAL BIOPSY      without cervical dilation    LAPAROSCOPY      (diagnostic)    LEG SURGERY      back of the leg     TONSILLECTOMY      TUBAL LIGATION         Medications      Current Outpatient Medications:     Calcium 500 MG tablet, Take 2 tablets by mouth daily 600mg , Disp: , Rfl:     Cyanocobalamin (B-12) 1000 MCG CAPS, Take 1 capsule by mouth daily, Disp: , Rfl:     Multiple Vitamin (MULTI-VITAMIN DAILY) TABS, Take 1 tablet by mouth daily, Disp: , Rfl:     Omega-3 Fatty Acids (FISH OIL) 645 MG CAPS, Take 1 capsule by mouth daily, Disp: , Rfl:     pregabalin (LYRICA) 50 mg capsule, Take 1 capsule (50 mg total) by mouth 2 (two) times a day, Disp: 60 capsule, Rfl: 1    Pyridoxine HCl (BL VITAMIN B-6 PO), Take 1 tablet by mouth daily, Disp: , Rfl:     meclizine (ANTIVERT) 12 5 MG tablet, Take 1 tablet (12 5 mg total) by mouth every 8 (eight) hours as needed for dizziness (Patient not taking: Reported on 8/26/2022), Disp: 30 tablet, Rfl: 0    Allergies    No Known Allergies    The following portions of the patient's history were reviewed and updated as appropriate: allergies, current medications, past family history, past medical history, past social history, past surgical history and problem list     Investigations    I personally reviewed the MRI results with the patient:    MRI of the lumbar spine without contrast dated July 15th, 2022  Grade 1 lytic anterolisthesis of L5 on S1  Degenerative changes throughout the lumbar spine with concave right lower lumbar degenerative scoliosis  Mild-to-moderate left foraminal stenosis at L2-3  At L4-5 there is right greater than left lateral recess and foraminal stenosis secondary to deformity and facet ligamentous hypertrophy  At L5-S1 there is right-greater-than-left foraminal stenosis secondary to anterolisthesis  No other areas of significant neural compression  Intrathecal contents unremarkable  Physical Exam    Vitals:  Blood pressure 142/86, pulse 56, temperature (!) 97 3 °F (36 3 °C), temperature source Temporal, height 4' 10" (1 473 m), weight 56 7 kg (125 lb), SpO2 96 %, not currently breastfeeding  ,Body mass index is 26 13 kg/m²  Physical Exam  Vitals reviewed  Constitutional:       General: She is not in acute distress  Eyes:      Extraocular Movements: Extraocular movements intact  Pulmonary:      Effort: Pulmonary effort is normal  No respiratory distress  Musculoskeletal:         General: Deformity (Thoracolumbar scoliosis  ) present  Skin:     General: Skin is warm and dry  Neurological:      Mental Status: She is alert and oriented to person, place, and time  Comments: 5/5 power in lower extremities      Reports normal light touch sensation lower extremities aside from decreased light touch sensation over right great toe  Walks with a steady but mildly antalgic gait favoring the right leg  Psychiatric:         Mood and Affect: Mood normal          Behavior: Behavior normal        Neurologic Exam     Mental Status   Oriented to person, place, and time

## 2022-08-26 NOTE — TELEPHONE ENCOUNTER
Patient LM on nurse line stating that she had an appt with Dr Sharon Boyd this morning and she wanted to make sure he felt it was "safe to undergo further SID's" or if he thought the "space was too narrow to be worth it at this time"  I advised her that if Dr Sharon Boyd recommended to continue conservative treatment at this time, it is probably okay for her to obtain another injection but that I would forward her message to him and call her back with his recommendation  She was appreciative

## 2022-09-14 ENCOUNTER — HOSPITAL ENCOUNTER (OUTPATIENT)
Dept: RADIOLOGY | Age: 77
Discharge: HOME | End: 2022-09-14
Payer: MEDICARE

## 2022-09-14 VITALS — BODY MASS INDEX: 26.24 KG/M2 | WEIGHT: 125 LBS | HEIGHT: 58 IN

## 2022-09-14 DIAGNOSIS — Z12.31 ENCOUNTER FOR SCREENING MAMMOGRAM FOR BREAST CANCER: ICD-10-CM

## 2022-09-14 PROCEDURE — 77063 BREAST TOMOSYNTHESIS BI: CPT

## 2022-09-14 PROCEDURE — 77067 SCR MAMMO BI INCL CAD: CPT

## 2022-10-10 ENCOUNTER — TELEPHONE (OUTPATIENT)
Dept: PAIN MEDICINE | Facility: CLINIC | Age: 77
End: 2022-10-10

## 2022-10-10 DIAGNOSIS — M51.16 INTERVERTEBRAL DISC DISORDER WITH RADICULOPATHY OF LUMBAR REGION: ICD-10-CM

## 2022-10-10 RX ORDER — PREGABALIN 75 MG/1
75 CAPSULE ORAL 2 TIMES DAILY
Qty: 60 CAPSULE | Refills: 5 | Status: SHIPPED | OUTPATIENT
Start: 2022-10-10

## 2022-10-10 NOTE — TELEPHONE ENCOUNTER
Caller: patient    Doctor: Gerardo Nelson    Reason for call: wants to change lyrica dose with nurse    Call back#: [phone]

## 2022-10-10 NOTE — TELEPHONE ENCOUNTER
S/w pt, states she had seen Dr Alfie De Los Santos and is determined not a surgical candidate  pt states Dr Alfie De Los Santos is also recommending to increase her lyrica  Pt is amenable to increase her lyrica from 50mg am, 75 mg at hs or 75mg bid  Pls advise  Pt needs rf of her lyrica  Pt says on current dose of 50 mg bid, she has no s/e  Pls advise

## 2022-10-11 NOTE — TELEPHONE ENCOUNTER
Pt made aware that Dr Katie Cruz sent a new script for higher dose of lyrica, 75 ng twice a day to her pharmacy  Pt given f[facility]u [facility] w/ Collette Horan for 11/30/22 at 9:30

## 2022-11-30 ENCOUNTER — OFFICE VISIT (OUTPATIENT)
Dept: PAIN MEDICINE | Facility: CLINIC | Age: 77
End: 2022-11-30

## 2022-11-30 VITALS
HEIGHT: 58 IN | DIASTOLIC BLOOD PRESSURE: 84 MMHG | BODY MASS INDEX: 26.66 KG/M2 | HEART RATE: 67 BPM | WEIGHT: 127 LBS | RESPIRATION RATE: 16 BRPM | SYSTOLIC BLOOD PRESSURE: 136 MMHG

## 2022-11-30 DIAGNOSIS — M51.16 INTERVERTEBRAL DISC DISORDER WITH RADICULOPATHY OF LUMBAR REGION: ICD-10-CM

## 2022-11-30 DIAGNOSIS — G89.4 CHRONIC PAIN SYNDROME: Primary | ICD-10-CM

## 2022-11-30 NOTE — PROGRESS NOTES
Assessment:  1  Chronic pain syndrome    2  Intervertebral disc disorder with radiculopathy of lumbar region        Plan:  The patient is a 70-year-old female with a history of chronic pain secondary to low back pain and lumbar intervertebral disc disorder with radiculopathy who presents to the office with ongoing right-sided low back pain and pain that radiates into the right lower extremity stopping at the right foot that is unchanged since her last office visit  I instructed patient we can repeat the injection to decrease inflammation provide her relief  We can also change the injection, utilizing an interlaminar approach to provide her more relief  At this time, patient states she will discuss with her  and call our office if she would like to move forward and I will place the order at the time  Can consider increasing Lyrica to 75 mg 1 tablet 3 times a day, however will await the patient's decision moving forward with injection  Refills for Lyrica and I needed at this time  My impressions and treatment recommendations were discussed in detail with the patient who verbalized understanding and had no further questions  Discharge instructions were provided  I personally saw and examined the patient and I agree with the above discussed plan of care  No orders of the defined types were placed in this encounter  No orders of the defined types were placed in this encounter  History of Present Illness:  Denise Rasmussen is a 78 y o  female with a history of chronic pain secondary to low back pain and lumbar intervertebral disc disorder with radiculopathy  She was last seen on 06/15/2022 where she was started on Lyrica 50 mg 1 tablet twice a day which was later increased to 75 mg twice a day  She presents to the office with ongoing right-sided low back pain and pain into the right lower extremity stopping at the right foot        She states her pain is the same since the last office visit and intermittent  She rates the quality of her pain as dull/aching, pins/needles and is currently rating it a 5/10 on a numeric scale  Current pain medications include Lyrica 75 mg twice a day and ibuprofen as needed  She states this medication regimen is providing her mild to moderate relief of her pain without side effects  Patient underwent a right-sided L4-L5 TFESI on 03/25/2022 and states she experienced worsening pain after that procedure as she felt a nerve was hit  Patient also underwent this same procedure on 07/20/2021 which provided her 7 months of a reduction in her pain  She was also seen and evaluated by 2 separate neurosurgeons who states she is not a surgical candidate  I have personally reviewed and/or updated the patient's past medical history, past surgical history, family history, social history, current medications, allergies, and vital signs today  Review of Systems   Respiratory: Negative for shortness of breath  Cardiovascular: Negative for chest pain  Gastrointestinal: Negative for constipation, diarrhea, nausea and vomiting  Musculoskeletal: Positive for back pain  Negative for arthralgias, gait problem, joint swelling and myalgias  RLE Pain   Skin: Negative for rash  Neurological: Negative for dizziness, seizures and weakness  All other systems reviewed and are negative        Patient Active Problem List   Diagnosis   • Generalized abdominal pain   • IBS (irritable bowel syndrome)   • T-cell lymphoma (HCC)   • Intervertebral disc disorder with radiculopathy of lumbar region       Past Medical History:   Diagnosis Date   • Dizziness    • Hyperlipidemia     Mild   • Lung mass    • Lymphoma ([facility] [address] )    • Menopause    • Uterine leiomyoma        Past Surgical History:   Procedure Laterality Date   • COLONOSCOPY      complete   • ENDOMETRIAL BIOPSY      without cervical dilation   • LAPAROSCOPY      (diagnostic)   • LEG SURGERY      back of the leg    • TONSILLECTOMY     • TUBAL LIGATION         Family History   Problem Relation Age of Onset   • Dementia Mother    • Cancer Maternal Aunt    • Breast cancer Other    • Heart disease Father    • No Known Problems Daughter    • No Known Problems Maternal Grandmother    • Leukemia Maternal Grandfather    • No Known Problems Paternal Grandmother    • No Known Problems Paternal Grandfather    • Breast cancer additional onset Cousin 29   • No Known Problems Paternal Aunt    • No Known Problems Paternal Aunt    • No Known Problems Paternal Aunt        Social History     Occupational History   • Occupation: retired   Tobacco Use   • Smoking status: Former     Years: 10 00     Types: Cigarettes   • Smokeless tobacco: Never   • Tobacco comments:     30 years since last smoked    Vaping Use   • Vaping Use: Never used   Substance and Sexual Activity   • Alcohol use: Yes     Comment: Drinks hard liquor, drinks wine, seldom    • Drug use: No   • Sexual activity: Yes     Partners: Male     Birth control/protection: Post-menopausal       Current Outpatient Medications on File Prior to Visit   Medication Sig   • Calcium 500 MG tablet Take 2 tablets by mouth daily 600mg    • Cyanocobalamin (B-12) 1000 MCG CAPS Take 1 capsule by mouth daily   • Multiple Vitamin (MULTI-VITAMIN DAILY) TABS Take 1 tablet by mouth daily   • Omega-3 Fatty Acids (FISH OIL) 645 MG CAPS Take 1 capsule by mouth daily   • pregabalin (LYRICA) 75 mg capsule Take 1 capsule (75 mg total) by mouth 2 (two) times a day   • Pyridoxine HCl (BL VITAMIN B-6 PO) Take 1 tablet by mouth daily   • meclizine (ANTIVERT) 12 5 MG tablet Take 1 tablet (12 5 mg total) by mouth every 8 (eight) hours as needed for dizziness (Patient not taking: Reported on 8/26/2022)     No current facility-administered medications on file prior to visit         No Known Allergies    Physical Exam:    /84   Pulse 67   Resp 16   Ht 4' 10" (1 473 m)   Wt 57 6 kg (127 lb)   BMI 26 54 kg/m² Constitutional:normal, well developed, well nourished, alert, in no distress and non-toxic and no overt pain behavior    Eyes:anicteric  HEENT:grossly intact  Neck:supple, symmetric, trachea midline and no masses   Pulmonary:even and unlabored  Cardiovascular:No edema or pitting edema present  Skin:Normal without rashes or lesions and well hydrated  Psychiatric:Mood and affect appropriate  Neurologic:Cranial Nerves II-XII grossly intact  Musculoskeletal:normal     Lumbar Spine Exam    Appearance:  Normal lordosis  Palpation/Tenderness:  no tenderness or spasm  Sensory:  no sensory deficits noted  Range of Motion:  Flexion:  No limitation  without pain  Extension:  Minimally limited  with pain  Motor Strength:  Left hip flexion:  5/5  Right hip flexion:  5/5  Left knee flexion:  5/5  Left knee extension:  5/5  Right knee flexion:  5/5  Right knee extension:  5/5  Left foot dorsiflexion:  5/5  Left foot plantar flexion:  5/5  Right foot dorsiflexion:  5/5  Right foot plantar flexion:  5/5      Imaging

## 2023-01-18 ENCOUNTER — TELEPHONE (OUTPATIENT)
Dept: PAIN MEDICINE | Facility: CLINIC | Age: 78
End: 2023-01-18

## 2023-01-18 DIAGNOSIS — M51.16 INTERVERTEBRAL DISC DISORDER WITH RADICULOPATHY OF LUMBAR REGION: ICD-10-CM

## 2023-01-18 RX ORDER — PREGABALIN 75 MG/1
75 CAPSULE ORAL 3 TIMES DAILY
Qty: 90 CAPSULE | Refills: 2 | Status: SHIPPED | OUTPATIENT
Start: 2023-01-18

## 2023-01-18 NOTE — TELEPHONE ENCOUNTER
S/w pt, states she still have pain on her right leg 8/10, pain is constant during ambulation  Has ov 11/30, pt says AS offered her to increase her lyrica to tid  Pt is requesting the increase  Pls advise

## 2023-01-18 NOTE — TELEPHONE ENCOUNTER
S/w pt, informed lyrica is increased to tid and is sent to [facility] pharmacy today  Pt verbalized appreciation

## 2023-01-27 ENCOUNTER — TRANSCRIBE ORDERS (OUTPATIENT)
Dept: PAIN MEDICINE | Facility: CLINIC | Age: 78
End: 2023-01-27

## 2023-03-30 ENCOUNTER — OFFICE VISIT (OUTPATIENT)
Dept: INTERNAL MEDICINE CLINIC | Facility: CLINIC | Age: 78
End: 2023-03-30

## 2023-03-30 VITALS
SYSTOLIC BLOOD PRESSURE: 120 MMHG | BODY MASS INDEX: 26.45 KG/M2 | TEMPERATURE: 97.9 F | WEIGHT: 126 LBS | OXYGEN SATURATION: 99 % | HEIGHT: 58 IN | DIASTOLIC BLOOD PRESSURE: 72 MMHG | HEART RATE: 67 BPM

## 2023-03-30 DIAGNOSIS — C85.90 T-CELL LYMPHOMA (HCC): ICD-10-CM

## 2023-03-30 DIAGNOSIS — E66.3 OVERWEIGHT (BMI 25.0-29.9): ICD-10-CM

## 2023-03-30 DIAGNOSIS — Z12.31 ENCOUNTER FOR SCREENING MAMMOGRAM FOR BREAST CANCER: ICD-10-CM

## 2023-03-30 DIAGNOSIS — M85.80 OSTEOPENIA, UNSPECIFIED LOCATION: ICD-10-CM

## 2023-03-30 DIAGNOSIS — Z23 IMMUNIZATION DUE: ICD-10-CM

## 2023-03-30 DIAGNOSIS — Z13.820 ENCOUNTER FOR OSTEOPOROSIS SCREENING IN ASYMPTOMATIC POSTMENOPAUSAL PATIENT: Primary | ICD-10-CM

## 2023-03-30 DIAGNOSIS — Z78.0 ENCOUNTER FOR OSTEOPOROSIS SCREENING IN ASYMPTOMATIC POSTMENOPAUSAL PATIENT: Primary | ICD-10-CM

## 2023-03-30 DIAGNOSIS — Z00.00 MEDICARE ANNUAL WELLNESS VISIT, SUBSEQUENT: ICD-10-CM

## 2023-03-30 DIAGNOSIS — Z23 ENCOUNTER FOR IMMUNIZATION: ICD-10-CM

## 2023-03-30 DIAGNOSIS — L60.9 NAIL ABNORMALITIES: ICD-10-CM

## 2023-03-30 RX ORDER — ZOSTER VACCINE RECOMBINANT, ADJUVANTED 50 MCG/0.5
0.5 KIT INTRAMUSCULAR ONCE
Qty: 1 EACH | Refills: 1 | Status: SHIPPED | OUTPATIENT
Start: 2023-03-30 | End: 2023-03-30

## 2023-03-30 NOTE — PROGRESS NOTES
Assessment and Plan:     Problem List Items Addressed This Visit        Other    T-cell lymphoma ([facility] [address] )   Other Visit Diagnoses     Encounter for osteoporosis screening in asymptomatic postmenopausal patient    -  Primary    Relevant Orders    DXA bone density spine hip and pelvis    Encounter for screening mammogram for breast cancer        Relevant Orders    Mammo screening bilateral w 3d & cad    Medicare annual wellness visit, subsequent        Osteopenia, unspecified location        Relevant Orders    CBC and differential    Comprehensive metabolic panel    Lipid panel    DXA bone density spine hip and pelvis    Overweight (BMI 25 0-29  9)        Relevant Orders    CBC and differential    Comprehensive metabolic panel    Lipid panel    Nail abnormalities        Relevant Orders    XR hand 3+ vw left    XR hand 3+ vw right    Immunization due        Relevant Medications    Zoster Vac Recomb Adjuvanted (Shingrix) 50 MCG/0 5ML SUSR    Encounter for immunization        Relevant Orders    Pneumococcal Conjugate Vaccine 20-valent (Pcv20)        BMI Counseling: Body mass index is 26 33 kg/m²  The BMI is above normal  Nutrition recommendations include decreasing portion sizes, encouraging healthy choices of fruits and vegetables, decreasing fast food intake, consuming healthier snacks and moderation in carbohydrate intake  Exercise recommendations include exercising 3-5 times per week  Rationale for BMI follow-up plan is due to patient being overweight or obese  Depression Screening and Follow-up Plan: Patient was screened for depression during today's encounter  They screened negative with a PHQ-2 score of 0  Preventive health issues were discussed with patient, and age appropriate screening tests were ordered as noted in patient's After Visit Summary  Personalized health advice and appropriate referrals for health education or preventive services given if needed, as noted in patient's After Visit Summary  History of Present Illness:     Patient presents for a Medicare Wellness Visit    Patient comes for Medicare annual wellness     Patient Care Team:  Nilesh Baugh MD as PCP - General  Patti Zacarias MD     Review of Systems:     Review of Systems   Constitutional: Negative for appetite change, chills, diaphoresis, fatigue, fever and unexpected weight change  Respiratory: Negative for apnea, cough, choking, chest tightness, shortness of breath, wheezing and stridor  Cardiovascular: Negative for chest pain, palpitations and leg swelling  Gastrointestinal: Negative for abdominal distention, abdominal pain, anal bleeding, blood in stool, constipation, diarrhea, nausea and vomiting  Genitourinary: Negative for decreased urine volume, difficulty urinating, frequency and urgency  Musculoskeletal: Negative for arthralgias, back pain and myalgias  Neurological: Negative for dizziness, light-headedness, numbness and headaches          Problem List:     Patient Active Problem List   Diagnosis   • Generalized abdominal pain   • IBS (irritable bowel syndrome)   • T-cell lymphoma ([facility] )   • Intervertebral disc disorder with radiculopathy of lumbar region      Past Medical and Surgical History:     Past Medical History:   Diagnosis Date   • Dizziness    • Hyperlipidemia     Mild   • Lung mass    • [facility]    • Menopause    • Uterine leiomyoma      Past Surgical History:   Procedure Laterality Date   • COLONOSCOPY      complete   • ENDOMETRIAL BIOPSY      without cervical dilation   • LAPAROSCOPY      (diagnostic)   • LEG SURGERY      back of the leg    • TONSILLECTOMY     • TUBAL LIGATION        Family History:     Family History   Problem Relation Age of Onset   • Dementia Mother    • Cancer Maternal Aunt    • Breast cancer Other    • Heart disease Father    • No Known Problems Daughter    • No Known Problems Maternal Grandmother    • Leukemia Maternal Grandfather    • No Known Problems Paternal Grandmother • No Known Problems Paternal Grandfather    • Breast cancer additional onset Cousin 29   • No Known Problems Paternal Aunt    • No Known Problems Paternal Aunt    • No Known Problems Paternal Aunt       Social History:     Social History     Socioeconomic History   • Marital status: /Civil Union     Spouse name: None   • Number of children: None   • Years of education: None   • Highest education level: None   Occupational History   • Occupation: retired   Tobacco Use   • Smoking status: Former     Years: 10 00     Types: Cigarettes   • Smokeless tobacco: Never   • Tobacco comments:     30 years since last smoked    Vaping Use   • Vaping Use: Never used   Substance and Sexual Activity   • Alcohol use: Yes     Comment: Occasionally   • Drug use: No   • Sexual activity: Yes     Partners: Male     Birth control/protection: Post-menopausal   Other Topics Concern   • None   Social History Narrative    Activities-Treadmill    Daily coffee consumption (1 cups/day)    Exercise: walking    Exercising regularly     Social Determinants of Health     Financial Resource Strain: Low Risk    • Difficulty of Paying Living Expenses: Not hard at all   Food Insecurity: Not on file   Transportation Needs: No Transportation Needs   • Lack of Transportation (Medical): No   • Lack of Transportation (Non-Medical):  No   Physical Activity: Not on file   Stress: Not on file   Social Connections: Not on file   Intimate Partner Violence: Not on file   Housing Stability: Not on file      Medications and Allergies:     Current Outpatient Medications   Medication Sig Dispense Refill   • Calcium 500 MG tablet Take 2 tablets by mouth daily 600mg      • Cyanocobalamin (B-12) 1000 MCG CAPS Take 1 capsule by mouth daily     • Multiple Vitamin (MULTI-VITAMIN DAILY) TABS Take 1 tablet by mouth daily     • Omega-3 Fatty Acids (FISH OIL) 645 MG CAPS Take 1 capsule by mouth daily     • pregabalin (LYRICA) 75 mg capsule Take 1 capsule (75 mg total) by mouth 3 (three) times a day 90 capsule 2   • Pyridoxine HCl (BL VITAMIN B-6 PO) Take 1 tablet by mouth daily     • Zoster Vac Recomb Adjuvanted (Shingrix) 50 MCG/0 5ML SUSR Inject 0 5 mL into a muscle once for 1 dose Repeat dose in 2 to 6 months 1 each 1   • meclizine (ANTIVERT) 12 5 MG tablet Take 1 tablet (12 5 mg total) by mouth every 8 (eight) hours as needed for dizziness (Patient not taking: Reported on 8/26/2022) 30 tablet 0     No current facility-administered medications for this visit  No Known Allergies   Immunizations:     Immunization History   Administered Date(s) Administered   • COVID-19 PFIZER VACCINE 0 3 ML IM 03/05/2021, 03/26/2021, 10/01/2021, 06/29/2022   • INFLUENZA 10/05/2021   • Influenza, seasonal, injectable 01/01/2014   • Pneumococcal Conjugate 13-Valent 01/01/2016      Health Maintenance:         Topic Date Due   • Breast Cancer Screening: Mammogram  09/14/2023   • Colorectal Cancer Screening  08/09/2026   • Hepatitis C Screening  Completed         Topic Date Due   • Pneumococcal Vaccine: 65+ Years (2 - PPSV23 if available, else PCV20) 01/01/2017   • COVID-19 Vaccine (5 - Booster for Pfizer series) 08/24/2022   • Influenza Vaccine (1) 09/01/2022      Medicare Screening Tests and Risk Assessments:     Eugenie Jonas is here for her Subsequent Wellness visit  Last Medicare Wellness visit information reviewed, patient interviewed and updates made to the record today  Health Risk Assessment:   Patient rates overall health as good  Patient feels that their physical health rating is slightly better  Patient is satisfied with their life  Eyesight was rated as same  Hearing was rated as same  Patient feels that their emotional and mental health rating is same  Patients states they are never, rarely angry  Patient states they are never, rarely unusually tired/fatigued  Pain experienced in the last 7 days has been none   Patient states that she has experienced no weight loss or gain in last 6 months  Depression Screening:   PHQ-2 Score: 0      Fall Risk Screening: In the past year, patient has experienced: no history of falling in past year      Urinary Incontinence Screening:   Patient has not leaked urine accidently in the last six months  Home Safety:  Patient does not have trouble with stairs inside or outside of their home  Patient has working smoke alarms and has no working carbon monoxide detector  Home safety hazards include: none  Nutrition:   Current diet is Regular and Limited junk food  Medications:   Patient is currently taking over-the-counter supplements  OTC medications include: see medication list  Patient is able to manage medications  Activities of Daily Living (ADLs)/Instrumental Activities of Daily Living (IADLs):   Walk and transfer into and out of bed and chair?: Yes  Dress and groom yourself?: Yes    Bathe or shower yourself?: Yes    Feed yourself?  Yes  Do your laundry/housekeeping?: Yes  Manage your money, pay your bills and track your expenses?: Yes  Make your own meals?: Yes    Do your own shopping?: Yes    Previous Hospitalizations:   Any hospitalizations or ED visits within the last 12 months?: No      Advance Care Planning:   Living will: Yes    Advanced directive: Yes      PREVENTIVE SCREENINGS      Cardiovascular Screening:    General: Screening Current      Diabetes Screening:     General: Screening Current      Colorectal Cancer Screening:     General: Screening Current      Breast Cancer Screening:     General: Screening Current      Cervical Cancer Screening:    General: Screening Not Indicated      Abdominal Aortic Aneurysm (AAA) Screening:        General: Screening Not Indicated      Lung Cancer Screening:     General: Screening Not Indicated      Hepatitis C Screening:    General: Screening Current    Screening, Brief Intervention, and Referral to Treatment (SBIRT)    Screening  Typical number of drinks in a day: 0  Typical number of drinks in "a week: 0  Interpretation: Low risk drinking behavior  Single Item Drug Screening:  How often have you used an illegal drug (including marijuana) or a prescription medication for non-medical reasons in the past year? never    Single Item Drug Screen Score: 0  Interpretation: Negative screen for possible drug use disorder    No results found  Physical Exam:     /72 (BP Location: Left arm, Patient Position: Sitting, Cuff Size: Standard)   Pulse 67   Temp 97 9 °F (36 6 °C) (Temporal)   Ht 4' 10\" (1 473 m)   Wt 57 2 kg (126 lb)   SpO2 99% Comment: RA  BMI 26 33 kg/m²     Physical Exam  Constitutional:       General: She is not in acute distress  Appearance: Normal appearance  She is normal weight  She is not ill-appearing, toxic-appearing or diaphoretic  Cardiovascular:      Rate and Rhythm: Normal rate and regular rhythm  Pulses: Normal pulses  Heart sounds: Normal heart sounds  No murmur heard  No gallop  Pulmonary:      Effort: Pulmonary effort is normal  No respiratory distress  Breath sounds: Normal breath sounds  No stridor  No wheezing, rhonchi or rales  Chest:      Chest wall: No tenderness  Abdominal:      General: Abdomen is flat  Palpations: Abdomen is soft  Musculoskeletal:         General: Normal range of motion  Skin:     General: Skin is warm and dry  Capillary Refill: Capillary refill takes less than 2 seconds  Neurological:      General: No focal deficit present  Mental Status: She is alert            González Flores MD  "

## 2023-04-21 ENCOUNTER — APPOINTMENT (OUTPATIENT)
Dept: RADIOLOGY | Age: 78
End: 2023-04-21

## 2023-04-21 DIAGNOSIS — L60.9 NAIL ABNORMALITIES: ICD-10-CM

## 2023-04-25 NOTE — RESULT ENCOUNTER NOTE
Please call and let the patient know that x-ray of the hand did not show any findings related to her nail changes

## 2023-06-09 ENCOUNTER — HOSPITAL ENCOUNTER (OUTPATIENT)
Dept: RADIOLOGY | Age: 78
Discharge: HOME | End: 2023-06-09
Payer: MEDICARE

## 2023-06-09 DIAGNOSIS — R22.32 MASS OF ARM, LEFT: ICD-10-CM

## 2023-06-09 PROCEDURE — 76882 US LMTD JT/FCL EVL NVASC XTR: CPT

## 2023-09-13 ENCOUNTER — TELEPHONE (OUTPATIENT)
Dept: ADMINISTRATIVE | Facility: OTHER | Age: 78
End: 2023-09-13

## 2023-09-13 NOTE — TELEPHONE ENCOUNTER
09/13/23 3:39 PM    Patient contacted (no answer/ line busy) to bring Advance Directive, POLST, or Living Will document to next scheduled pcp visit. Thank you.   Natalie Nelson MA  [facility]

## 2023-09-15 ENCOUNTER — HOSPITAL ENCOUNTER (OUTPATIENT)
Dept: RADIOLOGY | Age: 78
Discharge: HOME | End: 2023-09-15
Payer: MEDICARE

## 2023-09-15 VITALS — WEIGHT: 126 LBS | BODY MASS INDEX: 26.45 KG/M2 | HEIGHT: 58 IN

## 2023-09-15 DIAGNOSIS — Z13.820 ENCOUNTER FOR OSTEOPOROSIS SCREENING IN ASYMPTOMATIC POSTMENOPAUSAL PATIENT: ICD-10-CM

## 2023-09-15 DIAGNOSIS — Z78.0 ENCOUNTER FOR OSTEOPOROSIS SCREENING IN ASYMPTOMATIC POSTMENOPAUSAL PATIENT: ICD-10-CM

## 2023-09-15 DIAGNOSIS — Z12.31 ENCOUNTER FOR SCREENING MAMMOGRAM FOR BREAST CANCER: ICD-10-CM

## 2023-09-15 DIAGNOSIS — M85.80 OSTEOPENIA, UNSPECIFIED LOCATION: ICD-10-CM

## 2023-09-15 PROCEDURE — 77067 SCR MAMMO BI INCL CAD: CPT

## 2023-09-15 PROCEDURE — 77080 DXA BONE DENSITY AXIAL: CPT

## 2023-09-15 PROCEDURE — 77063 BREAST TOMOSYNTHESIS BI: CPT

## 2023-09-18 ENCOUNTER — OFFICE VISIT (OUTPATIENT)
Dept: INTERNAL MEDICINE CLINIC | Facility: CLINIC | Age: 78
End: 2023-09-18
Payer: MEDICARE

## 2023-09-18 VITALS
HEIGHT: 58 IN | OXYGEN SATURATION: 97 % | SYSTOLIC BLOOD PRESSURE: 169 MMHG | TEMPERATURE: 98.6 F | HEART RATE: 64 BPM | WEIGHT: 127 LBS | DIASTOLIC BLOOD PRESSURE: 70 MMHG | BODY MASS INDEX: 26.66 KG/M2

## 2023-09-18 DIAGNOSIS — M51.16 INTERVERTEBRAL DISC DISORDER WITH RADICULOPATHY OF LUMBAR REGION: ICD-10-CM

## 2023-09-18 DIAGNOSIS — E78.2 MIXED HYPERLIPIDEMIA: ICD-10-CM

## 2023-09-18 DIAGNOSIS — R03.0 ELEVATED BP WITHOUT DIAGNOSIS OF HYPERTENSION: Primary | ICD-10-CM

## 2023-09-18 PROCEDURE — 99214 OFFICE O/P EST MOD 30 MIN: CPT | Performed by: INTERNAL MEDICINE

## 2023-09-18 NOTE — PATIENT INSTRUCTIONS
DASH Eating Plan   WHAT YOU NEED TO KNOW:   The DASH (Dietary Approaches to Stop Hypertension) Eating Plan is designed to help prevent or lower high blood pressure. It can also help to lower LDL (bad) cholesterol and decrease your risk for heart disease. The plan is low in sodium, sugar, unhealthy fats, and total fat. It is high in potassium, calcium, magnesium, and fiber. These nutrients are added when you eat more fruits, vegetables, and whole grains. With the DASH eating plan, you need to eat a certain number of servings from each food group. This will help you get enough of certain nutrients and limit others. The amount of servings you should eat depends on how many calories you need. Your dietitian can help you create meal plans with the right number of servings for each food group. DISCHARGE INSTRUCTIONS:   What you need to know about sodium:  Your dietitian will tell you how much sodium is safe for you to have each day. People with high blood pressure should have no more than 1,500 to 2,300 mg of sodium in a day. A teaspoon (tsp) of salt has 2,300 mg of sodium. This may seem like a difficult goal, but small changes to the foods you eat can make a big difference. Your healthcare provider or dietitian can help you create a meal plan that follows your sodium limit. Read food labels. Food labels can help you choose foods that are low in sodium. The amount of sodium is listed in milligrams (mg). The % Daily Value (DV) column tells you how much of your daily needs are met by 1 serving of the food for each nutrient listed. Choose foods that have less than 5% of the DV of sodium. These foods are considered low in sodium. Foods that have 20% or more of the DV of sodium are considered high in sodium. Avoid foods that have more than 300 mg of sodium in each serving. Choose foods that say low-sodium, reduced-sodium, or no salt added on the food label. Limit added salt.   Do not salt food at the table if you add salt when you cook. Use herbs and spices, such as onions, garlic, and salt-free seasonings to add flavor. Try lemon or lime juice or vinegar to add a tart flavor. Use hot peppers or a small amount of hot pepper sauce to add a spicy flavor. Limit foods high in added salt, such as the following:    Seasonings made with salt, such as garlic salt, celery salt, onion salt, seasoned salt, meat tenderizers, and monosodium glutamate (MSG)    Miso soup and canned or dried soup mixes    Regular soy sauce, barbecue sauce, teriyaki sauce, steak sauce, Worcestershire sauce, and most flavored vinegars    Snack foods, such as salted chips, popcorn, pretzels, pork rinds, salted crackers, and salted nuts    Frozen foods, such as dinners, entrees, vegetables with sauces, and breaded meats    Ask about salt substitutes. Ask your healthcare provider if you may use salt substitutes. Some salt substitutes have ingredients that can be harmful if you have certain health conditions. Choose foods carefully at restaurants. Meals from restaurants, especially fast food restaurants, are often high in sodium. Some restaurants have nutrition information that tells you the amount of sodium in their foods. Ask to have your food prepared with less, or no salt. What you need to know about fats:  Healthy fats include unsaturated fats and omega-3 fatty acids. Unhealthy fats include saturated fats and trans fats.   Include healthy fats, such as the following:      Cooking oils, such as soybean, canola, olive, or sunflower    Fatty fish, such as salmon, tuna, mackerel, or sardines    Flaxseed oil or ground flaxseed    ½ cup of cooked beans, such as black beans, kidney beans, or brown beans    1½ ounces of low-sodium nuts, such as almonds or walnuts    Low-sugar, low-sodium peanut butter    Seeds such as river seeds or sunflower seeds       Limit or do not have unhealthy fats, such as the following:      Foods that contain fat from animals, such as fatty meats, whole milk, butter, and cream    Shortening, stick margarine, palm oil, and coconut oil    Full-fat or creamy salad dressing    Creamy soup    Crackers, chips, and baked goods made with margarine or shortening    Foods that are fried in unhealthy fats    Gravy and sauces, such as Keenan or cheese sauces    What you need to know about carbohydrates (carbs): All carbs break down into sugar. Complex carbs contain more fiber than simple carbs. This means complex carbs go into the bloodstream more slowly and cause less of a blood sugar spike. Try to include more complex carbs and fewer simple carbs. Include complex carbs, such as the followin slice of whole-grain bread    1 ounce of dry cereal that does not contain added sugar    ½ cup of cooked oatmeal    2 ounces of cooked whole-grain pasta    ½ cup of cooked brown rice    Limit or do not have simple carbs, such as the following:      [employer], such as doughnuts, pastries, and cookies    Mixes for cornbread and biscuits    White rice and pasta mixes, such as boxed macaroni and cheese    Instant and cold cereals that contain sugar    Jelly, jam, and ice cream that contain sugar    Condiments such as ketchup    Drinks high in sugar, such as soft drinks, lemonade, and fruit juice    What you need to know about vegetables and fruits:  Vegetables and fruits can be fresh, frozen, or canned. If possible, try to choose low-sodium canned options.   Include a variety of vegetables and fruits, such as the followin medium apple, pear, or peach (about ½ cup chopped)    ½ small banana    ½ cup berries, such as blueberries, strawberries, or blackberries    1 cup of raw leafy greens, such as lettuce, spinach, kale, or karri greens    ½ cup of frozen or canned (no added salt) vegetables, such as green beans    ½ cup of fresh, frozen, or canned fruit (canned in light syrup or fruit juice)    ½ cup of vegetable or fruit juice    Limit or do not have vegetables and fruits made in the following ways:      Frozen fruit such as cherries that have added sugar    Fruit in cream or butter sauce    Canned vegetables that are high in sodium    Sauerkraut, pickled vegetables, and other foods prepared in brine    Fried vegetables or vegetables in butter or high-fat sauces    What you need to know about protein foods: Include lean or low-fat protein foods, such as the following:      Poultry (chicken, turkey) with no skin    Fish (especially fatty fish, such as salmon, fresh tuna, or mackerel)    Lean beef and pork (loin, round, extra lean hamburger)    Egg whites and egg substitutes    1 cup of nonfat (skim) or 1% milk    1½ ounces of fat-free or low-fat cheese    6 ounces of nonfat or low-fat yogurt    Limit or do not have high-fat protein foods, such as the following:      Smoked or cured meat, such as corned beef, pitts, ham, hot dogs, and sausage    Canned beans and canned meats or spreads, such as potted meats, sardines, anchovies, and imitation seafood    Deli or lunch meats, such as bologna, ham, turkey, and roast beef    High-fat meat (T-bone steak, regular hamburger, and ribs)    Whole eggs and egg yolks    Whole milk, 2% milk, and cream    Regular cheese and processed cheese    Other guidelines to follow:   Maintain a healthy weight. Your risk for heart disease is higher if you are overweight. Your healthcare provider may suggest that you lose weight if you are overweight. You can lose weight by eating fewer calories and foods that have added sugars and fat. The DASH meal plan can help you do this. Decrease calories by eating smaller portions at each meal and fewer snacks. Ask your healthcare provider for more information about how to lose weight. Exercise regularly. Regular exercise can help you reach or maintain a healthy weight. Regular exercise can also help decrease your blood pressure and improve your cholesterol levels.  Get 30 minutes or more of moderate exercise each day of the week. To lose weight, get at least 60 minutes of exercise. Talk to your healthcare provider about the best exercise program for you. Limit alcohol. Women should limit alcohol to 1 drink a day. Men should limit alcohol to 2 drinks a day. A drink of alcohol is 12 ounces of beer, 5 ounces of wine, or 1½ ounces of liquor. For more information:   National [facility], [facility] and [address]  [id number]  [place] [address]  Phone: [phone]  Web Address: [email or web address]    © Copyright [employer] 2022 Information is for End User's use only and may not be sold, redistributed or otherwise used for commercial purposes. The above information is an  only. It is not intended as medical advice for individual conditions or treatments. Talk to your doctor, nurse or pharmacist before following any medical regimen to see if it is safe and effective for you.

## 2023-09-18 NOTE — PROGRESS NOTES
Assessment/Plan:    Diagnoses and all orders for this visit:    Elevated BP without diagnosis of hypertension  Comments:  Repeat /70, encourage DASH diet, home BP monitoring, call back if BP is consistently greater than 140/80. Mixed hyperlipidemia  Comments:  Diet controlled, continue to monitor. Labs have been ordered. Intervertebral disc disorder with radiculopathy of lumbar region  Comments: Follows with [facility] pain specialist, now improved since TSF pain in July 2023. Continue Lyrica              Patient Instructions     DASH Eating Plan   WHAT YOU NEED TO KNOW:   The DASH (Dietary Approaches to Stop Hypertension) Eating Plan is designed to help prevent or lower high blood pressure. It can also help to lower LDL (bad) cholesterol and decrease your risk for heart disease. The plan is low in sodium, sugar, unhealthy fats, and total fat. It is high in potassium, calcium, magnesium, and fiber. These nutrients are added when you eat more fruits, vegetables, and whole grains. With the DASH eating plan, you need to eat a certain number of servings from each food group. This will help you get enough of certain nutrients and limit others. The amount of servings you should eat depends on how many calories you need. Your dietitian can help you create meal plans with the right number of servings for each food group. DISCHARGE INSTRUCTIONS:   What you need to know about sodium:  Your dietitian will tell you how much sodium is safe for you to have each day. People with high blood pressure should have no more than 1,500 to 2,300 mg of sodium in a day. A teaspoon (tsp) of salt has 2,300 mg of sodium. This may seem like a difficult goal, but small changes to the foods you eat can make a big difference. Your healthcare provider or dietitian can help you create a meal plan that follows your sodium limit. • Read food labels. Food labels can help you choose foods that are low in sodium.  The amount of sodium is listed in milligrams (mg). The % Daily Value (DV) column tells you how much of your daily needs are met by 1 serving of the food for each nutrient listed. Choose foods that have less than 5% of the DV of sodium. These foods are considered low in sodium. Foods that have 20% or more of the DV of sodium are considered high in sodium. Avoid foods that have more than 300 mg of sodium in each serving. Choose foods that say low-sodium, reduced-sodium, or no salt added on the food label. • Limit added salt. Do not salt food at the table if you add salt when you cook. Use herbs and spices, such as onions, garlic, and salt-free seasonings to add flavor. Try lemon or lime juice or vinegar to add a tart flavor. Use hot peppers or a small amount of hot pepper sauce to add a spicy flavor. Limit foods high in added salt, such as the following:    ? Seasonings made with salt, such as garlic salt, celery salt, onion salt, seasoned salt, meat tenderizers, and monosodium glutamate (MSG)    ? Miso soup and canned or dried soup mixes    ? Regular soy sauce, barbecue sauce, teriyaki sauce, steak sauce, Worcestershire sauce, and most flavored vinegars    ? Snack foods, such as salted chips, popcorn, pretzels, pork rinds, salted crackers, and salted nuts    ? Frozen foods, such as dinners, entrees, vegetables with sauces, and breaded meats    • Ask about salt substitutes. Ask your healthcare provider if you may use salt substitutes. Some salt substitutes have ingredients that can be harmful if you have certain health conditions. • Choose foods carefully at restaurants. Meals from restaurants, especially fast food restaurants, are often high in sodium. Some restaurants have nutrition information that tells you the amount of sodium in their foods. Ask to have your food prepared with less, or no salt. What you need to know about fats:  Healthy fats include unsaturated fats and omega-3 fatty acids.  Unhealthy fats include saturated fats and trans fats. • Include healthy fats, such as the following:      ? Cooking oils, such as soybean, canola, olive, or sunflower    ? Fatty fish, such as salmon, tuna, mackerel, or sardines    ? Flaxseed oil or ground flaxseed    ? ½ cup of cooked beans, such as black beans, kidney beans, or brown beans    ? 1½ ounces of low-sodium nuts, such as almonds or walnuts    ? Low-sugar, low-sodium peanut butter    ? Seeds such as river seeds or sunflower seeds       • Limit or do not have unhealthy fats, such as the following:      ? Foods that contain fat from animals, such as fatty meats, whole milk, butter, and cream    ? Shortening, stick margarine, palm oil, and coconut oil    ? Full-fat or creamy salad dressing    ? Creamy soup    ? Crackers, chips, and baked goods made with margarine or shortening    ? Foods that are fried in unhealthy fats    ? Gravy and sauces, such as Keenan or cheese sauces    What you need to know about carbohydrates (carbs): All carbs break down into sugar. Complex carbs contain more fiber than simple carbs. This means complex carbs go into the bloodstream more slowly and cause less of a blood sugar spike. Try to include more complex carbs and fewer simple carbs. • Include complex carbs, such as the following:      ? 1 slice of whole-grain bread    ? 1 ounce of dry cereal that does not contain added sugar    ? ½ cup of cooked oatmeal    ? 2 ounces of cooked whole-grain pasta    ? ½ cup of cooked brown rice    • Limit or do not have simple carbs, such as the following:      ? Baked goods, such as doughnuts, pastries, and cookies    ? Mixes for cornbread and biscuits    ? White rice and pasta mixes, such as boxed macaroni and cheese    ? Instant and cold cereals that contain sugar    ? Jelly, jam, and ice cream that contain sugar    ? Condiments such as ketchup    ?  Drinks high in sugar, such as soft drinks, lemonade, and fruit juice    What you need to know about vegetables and fruits:  Vegetables and fruits can be fresh, frozen, or canned. If possible, try to choose low-sodium canned options. • Include a variety of vegetables and fruits, such as the following:      ? 1 medium apple, pear, or peach (about ½ cup chopped)    ? ½ small banana    ? ½ cup berries, such as blueberries, strawberries, or blackberries    ? 1 cup of raw leafy greens, such as lettuce, spinach, kale, or karri greens    ? ½ cup of frozen or canned (no added salt) vegetables, such as green beans    ? ½ cup of fresh, frozen, or canned fruit (canned in light syrup or fruit juice)    ? ½ cup of vegetable or fruit juice    • Limit or do not have vegetables and fruits made in the following ways:      ? Frozen fruit such as cherries that have added sugar    ? Fruit in cream or butter sauce    ? Canned vegetables that are high in sodium    ? Sauerkraut, pickled vegetables, and other foods prepared in brine    ? Fried vegetables or vegetables in butter or high-fat sauces    What you need to know about protein foods:   • Include lean or low-fat protein foods, such as the following:      ? Poultry (chicken, turkey) with no skin    ? Fish (especially fatty fish, such as salmon, fresh tuna, or mackerel)    ? Lean beef and pork (loin, round, extra lean hamburger)    ? Egg whites and egg substitutes    ? 1 cup of nonfat (skim) or 1% milk    ? 1½ ounces of fat-free or low-fat cheese    ? 6 ounces of nonfat or low-fat yogurt    • Limit or do not have high-fat protein foods, such as the following:      ? Smoked or cured meat, such as corned beef, pitts, ham, hot dogs, and sausage    ? Canned beans and canned meats or spreads, such as potted meats, sardines, anchovies, and imitation seafood    ? Deli or lunch meats, such as bologna, ham, turkey, and roast beef    ? High-fat meat (T-bone steak, regular hamburger, and ribs)    ? Whole eggs and egg yolks    ?  Whole milk, 2% milk, and cream    ? Regular cheese and processed cheese    Other guidelines to follow:   • Maintain a healthy weight. Your risk for heart disease is higher if you are overweight. Your healthcare provider may suggest that you lose weight if you are overweight. You can lose weight by eating fewer calories and foods that have added sugars and fat. The DASH meal plan can help you do this. Decrease calories by eating smaller portions at each meal and fewer snacks. Ask your healthcare provider for more information about how to lose weight. • Exercise regularly. Regular exercise can help you reach or maintain a healthy weight. Regular exercise can also help decrease your blood pressure and improve your cholesterol levels. Get 30 minutes or more of moderate exercise each day of the week. To lose weight, get at least 60 minutes of exercise. Talk to your healthcare provider about the best exercise program for you. • Limit alcohol. Women should limit alcohol to 1 drink a day. Men should limit alcohol to 2 drinks a day. A drink of alcohol is 12 ounces of beer, 5 ounces of wine, or 1½ ounces of liquor. For more information:   • National Heart, Lung and [address]  [id number]  [place] [address]  Phone: [phone]  Web Address: [email or web address]    © Copyright [employer] 2022 Information is for End User's use only and may not be sold, redistributed or otherwise used for commercial purposes. The above information is an  only. It is not intended as medical advice for individual conditions or treatments. Talk to your doctor, nurse or pharmacist before following any medical regimen to see if it is safe and effective for you. Subjective:      Patient ID: Richard Screen is a 70 y.o. female    Patient comes for follow-up of chronic medical conditions. Denies subjective complaints today.         Current Outpatient Medications:   •  Calcium 500 MG tablet, Take 2 tablets by mouth daily 600mg , Disp: , Rfl:   •  Cyanocobalamin (B-12) 1000 MCG CAPS, Take 1 capsule by mouth daily, Disp: , Rfl:   •  Multiple Vitamin (MULTI-VITAMIN DAILY) TABS, Take 1 tablet by mouth daily, Disp: , Rfl:   •  Omega-3 Fatty Acids (FISH OIL) 645 MG CAPS, Take 1 capsule by mouth daily, Disp: , Rfl:   •  pregabalin (LYRICA) 75 mg capsule, Take 1 capsule (75 mg total) by mouth 3 (three) times a day, Disp: 90 capsule, Rfl: 2  •  Pyridoxine HCl (BL VITAMIN B-6 PO), Take 1 tablet by mouth daily, Disp: , Rfl:      Past Medical History:   Diagnosis Date   • Dizziness    • Hyperlipidemia     Mild   • Lung mass    • Lymphoma ([facility])    • Menopause    • Uterine leiomyoma          Past Surgical History:   Procedure Laterality Date   • COLONOSCOPY      complete   • ENDOMETRIAL BIOPSY      without cervical dilation   • LAPAROSCOPY      (diagnostic)   • LEG SURGERY      back of the leg    • TONSILLECTOMY     • TUBAL LIGATION           No Known Allergies    No results found for this or any previous visit (from the past 1008 hour(s)). The following portions of the patient's history were reviewed and updated as appropriate: allergies, current medications, past family history, past medical history, past social history, past surgical history and problem list.     Review of Systems   Constitutional: Negative for appetite change, chills, diaphoresis, fatigue, fever and unexpected weight change. Respiratory: Negative for apnea, cough, choking, chest tightness, shortness of breath, wheezing and stridor. Cardiovascular: Negative for chest pain, palpitations and leg swelling. Gastrointestinal: Negative for abdominal distention, abdominal pain, anal bleeding, blood in stool, constipation, diarrhea, nausea and vomiting. Genitourinary: Negative for decreased urine volume, difficulty urinating, frequency and urgency. Musculoskeletal: Negative for arthralgias, back pain and myalgias.    Neurological: Negative for dizziness, light-headedness, numbness and headaches. Objective:      Vitals:    09/18/23 0924   BP: 169/70   Pulse: 64   Temp: 98.6 °F (37 °C)   SpO2: 97%          Physical Exam  Vitals reviewed. Constitutional:       General: She is not in acute distress. Appearance: Normal appearance. She is not ill-appearing, toxic-appearing or diaphoretic. HENT:      Mouth/Throat:      Mouth: Mucous membranes are moist.   Cardiovascular:      Rate and Rhythm: Normal rate and regular rhythm. Pulses: Normal pulses. Heart sounds: Normal heart sounds. No murmur heard. No friction rub. No gallop. Pulmonary:      Effort: Pulmonary effort is normal. No respiratory distress. Breath sounds: Normal breath sounds. No stridor. No wheezing, rhonchi or rales. Chest:      Chest wall: No tenderness. Musculoskeletal:      Right lower leg: No edema. Left lower leg: No edema. Skin:     General: Skin is warm and dry. Findings: No lesion or rash. Neurological:      Mental Status: She is alert.

## 2023-10-28 NOTE — H&P
History of Present Illness: The patient is a 78 y o  female who presents with complaints of right lower back and leg pain and is here today for right L4-5 transforaminal epidural steroid injection      Patient Active Problem List   Diagnosis    Generalized abdominal pain    IBS (irritable bowel syndrome)    T-cell lymphoma (HCC)    Intervertebral disc disorder with radiculopathy of lumbar region       Past Medical History:   Diagnosis Date    Hyperlipidemia     Mild    Lung mass     Lymphoma ([address] )     Menopause     Uterine leiomyoma        Past Surgical History:   Procedure Laterality Date    COLONOSCOPY      complete    ENDOMETRIAL BIOPSY      without cervical dilation    LAPAROSCOPY      (diagnostic)    LEG SURGERY      back of the leg     TONSILLECTOMY      TUBAL LIGATION           Current Outpatient Medications:     Calcium 500 MG tablet, Take 2 tablets by mouth daily, Disp: , Rfl:     Cyanocobalamin (B-12) 1000 MCG CAPS, Take 1 capsule by mouth daily, Disp: , Rfl:     Multiple Vitamin (MULTI-VITAMIN DAILY) TABS, Take 1 tablet by mouth daily, Disp: , Rfl:     Omega-3 Fatty Acids (FISH OIL) 645 MG CAPS, Take 1 capsule by mouth daily, Disp: , Rfl:     Pyridoxine HCl (BL VITAMIN B-6 PO), Take 1 tablet by mouth daily, Disp: , Rfl:     Current Facility-Administered Medications:     bupivacaine (PF) (MARCAINE) 0 25 % injection 2 mL, 2 mL, Epidural, Once, Jaison Cornell MD    iohexol (OMNIPAQUE) 300 mg/mL injection 1 mL, 1 mL, Epidural, Once, Jaison Cornell MD    lidocaine (PF) (XYLOCAINE-MPF) 2 % injection 4 mL, 4 mL, Infiltration, Once, Jaison Cornell MD    methylPREDNISolone acetate (DEPO-MEDROL) injection 80 mg, 80 mg, Epidural, Once, Jaison Cornell MD    sodium chloride (PF) 0 9 % injection 4 mL, 4 mL, Infiltration, Once, Jaison Cornell MD    No Known Allergies    Physical Exam:   Vitals:    03/25/22 0926   BP: 156/81   Pulse: 61   Resp: 18   Temp: 98 °F (36 7 °C)   SpO2: 99% Pt states waking up with abd cramps and spotting found with morning urination. Pt states she is 10 weeks gestation. General: Awake, Alert, Oriented x 3, Mood and affect appropriate  Respiratory: Respirations even and unlabored  Cardiovascular: Peripheral pulses intact; no edema  Musculoskeletal Exam:  Right lower back pain    ASA Score: 3    Patient/Chart Verification  Patient ID Verified: Verbal  ID Band Applied: No  Consents Confirmed: Procedural,To be obtained in the Pre-Procedure area  H&P( within 30 days) Verified: To be obtained in the Pre-Procedure area  Allergies Reviewed: Yes  Anticoag/NSAID held?: No  Currently on antibiotics?: No    Assessment:   1   Lumbar disc disease with radiculopathy        Plan: R L4-5 TFESI

## 2023-11-01 ENCOUNTER — TELEPHONE (OUTPATIENT)
Dept: INTERNAL MEDICINE CLINIC | Facility: CLINIC | Age: 78
End: 2023-11-01

## 2023-11-01 DIAGNOSIS — J01.00 ACUTE NON-RECURRENT MAXILLARY SINUSITIS: Primary | ICD-10-CM

## 2023-11-01 RX ORDER — AZITHROMYCIN 250 MG/1
TABLET, FILM COATED ORAL
Qty: 6 TABLET | Refills: 0 | Status: SHIPPED | OUTPATIENT
Start: 2023-11-01 | End: 2023-11-05

## 2023-11-01 NOTE — TELEPHONE ENCOUNTER
Spoke to her and advised her to use Ocean Spray 2 sprays in each nostril every 2-3 hours for the next 24 hours if unimproved then start antibiotic.

## 2023-11-09 ENCOUNTER — APPOINTMENT (OUTPATIENT)
Dept: LAB | Facility: CLINIC | Age: 78
End: 2023-11-09
Payer: MEDICARE

## 2023-11-09 DIAGNOSIS — M85.80 OSTEOPENIA, UNSPECIFIED LOCATION: ICD-10-CM

## 2023-11-09 DIAGNOSIS — E66.3 OVERWEIGHT (BMI 25.0-29.9): ICD-10-CM

## 2023-11-09 LAB
ALBUMIN SERPL BCP-MCNC: 3.9 G/DL (ref 3.5–5)
ALP SERPL-CCNC: 52 U/L (ref 34–104)
ALT SERPL W P-5'-P-CCNC: 23 U/L (ref 7–52)
ANION GAP SERPL CALCULATED.3IONS-SCNC: 6 MMOL/L
AST SERPL W P-5'-P-CCNC: 30 U/L (ref 13–39)
BASOPHILS # BLD AUTO: 0.06 THOUSANDS/ÂΜL (ref 0–0.1)
BASOPHILS NFR BLD AUTO: 1 % (ref 0–1)
BILIRUB SERPL-MCNC: 0.53 MG/DL (ref 0.2–1)
BUN SERPL-MCNC: 18 MG/DL (ref 5–25)
CALCIUM SERPL-MCNC: 9.3 MG/DL (ref 8.4–10.2)
CHLORIDE SERPL-SCNC: 106 MMOL/L (ref 96–108)
CHOLEST SERPL-MCNC: 194 MG/DL
CO2 SERPL-SCNC: 30 MMOL/L (ref 21–32)
CREAT SERPL-MCNC: 0.79 MG/DL (ref 0.6–1.3)
EOSINOPHIL # BLD AUTO: 0.05 THOUSAND/ÂΜL (ref 0–0.61)
EOSINOPHIL NFR BLD AUTO: 1 % (ref 0–6)
ERYTHROCYTE [DISTWIDTH] IN BLOOD BY AUTOMATED COUNT: 14.1 % (ref 11.6–15.1)
GFR SERPL CREATININE-BSD FRML MDRD: 72 ML/MIN/1.73SQ M
GLUCOSE P FAST SERPL-MCNC: 85 MG/DL (ref 65–99)
HCT VFR BLD AUTO: 43.4 % (ref 34.8–46.1)
HDLC SERPL-MCNC: 73 MG/DL
HGB BLD-MCNC: 13.6 G/DL (ref 11.5–15.4)
IMM GRANULOCYTES # BLD AUTO: 0.02 THOUSAND/UL (ref 0–0.2)
IMM GRANULOCYTES NFR BLD AUTO: 0 % (ref 0–2)
LDLC SERPL CALC-MCNC: 109 MG/DL (ref 0–100)
LYMPHOCYTES # BLD AUTO: 2.58 THOUSANDS/ÂΜL (ref 0.6–4.47)
LYMPHOCYTES NFR BLD AUTO: 34 % (ref 14–44)
MCH RBC QN AUTO: 29.5 PG (ref 26.8–34.3)
MCHC RBC AUTO-ENTMCNC: 31.3 G/DL (ref 31.4–37.4)
MCV RBC AUTO: 94 FL (ref 82–98)
MONOCYTES # BLD AUTO: 0.73 THOUSAND/ÂΜL (ref 0.17–1.22)
MONOCYTES NFR BLD AUTO: 10 % (ref 4–12)
NEUTROPHILS # BLD AUTO: 4.05 THOUSANDS/ÂΜL (ref 1.85–7.62)
NEUTS SEG NFR BLD AUTO: 54 % (ref 43–75)
NONHDLC SERPL-MCNC: 121 MG/DL
NRBC BLD AUTO-RTO: 0 /100 WBCS
PLATELET # BLD AUTO: 232 THOUSANDS/UL (ref 149–390)
PMV BLD AUTO: 11.5 FL (ref 8.9–12.7)
POTASSIUM SERPL-SCNC: 4.6 MMOL/L (ref 3.5–5.3)
PROT SERPL-MCNC: 6.5 G/DL (ref 6.4–8.4)
RBC # BLD AUTO: 4.61 MILLION/UL (ref 3.81–5.12)
SODIUM SERPL-SCNC: 142 MMOL/L (ref 135–147)
TRIGL SERPL-MCNC: 58 MG/DL
WBC # BLD AUTO: 7.49 THOUSAND/UL (ref 4.31–10.16)

## 2023-11-09 PROCEDURE — 85025 COMPLETE CBC W/AUTO DIFF WBC: CPT

## 2023-11-09 PROCEDURE — 80053 COMPREHEN METABOLIC PANEL: CPT

## 2023-11-09 PROCEDURE — 80061 LIPID PANEL: CPT

## 2023-11-09 PROCEDURE — 36415 COLL VENOUS BLD VENIPUNCTURE: CPT

## 2024-04-04 ENCOUNTER — OFFICE VISIT (OUTPATIENT)
Dept: INTERNAL MEDICINE CLINIC | Facility: CLINIC | Age: 79
End: 2024-04-04
Payer: MEDICARE

## 2024-04-04 VITALS
HEART RATE: 72 BPM | TEMPERATURE: 98.5 F | WEIGHT: 133 LBS | SYSTOLIC BLOOD PRESSURE: 118 MMHG | DIASTOLIC BLOOD PRESSURE: 74 MMHG | OXYGEN SATURATION: 99 % | HEIGHT: 58 IN | BODY MASS INDEX: 27.92 KG/M2

## 2024-04-04 DIAGNOSIS — Z00.00 MEDICARE ANNUAL WELLNESS VISIT, SUBSEQUENT: ICD-10-CM

## 2024-04-04 DIAGNOSIS — M79.89 LEG SWELLING: ICD-10-CM

## 2024-04-04 DIAGNOSIS — M51.16 INTERVERTEBRAL DISC DISORDER WITH RADICULOPATHY OF LUMBAR REGION: Primary | ICD-10-CM

## 2024-04-04 DIAGNOSIS — Z23 IMMUNIZATION DUE: ICD-10-CM

## 2024-04-04 DIAGNOSIS — C84.09 MYCOSIS FUNGOIDES, EXTRANODAL AND SOLID ORGAN SITES (HCC): ICD-10-CM

## 2024-04-04 DIAGNOSIS — C85.90 T-CELL LYMPHOMA (HCC): ICD-10-CM

## 2024-04-04 PROCEDURE — G0439 PPPS, SUBSEQ VISIT: HCPCS | Performed by: INTERNAL MEDICINE

## 2024-04-04 RX ORDER — FUROSEMIDE 20 MG/1
20 TABLET ORAL DAILY
Qty: 15 TABLET | Refills: 0 | Status: SHIPPED | OUTPATIENT
Start: 2024-04-04 | End: 2024-04-19

## 2024-04-04 RX ORDER — ZOSTER VACCINE RECOMBINANT, ADJUVANTED 50 MCG/0.5
0.5 KIT INTRAMUSCULAR ONCE
Qty: 1 EACH | Refills: 1 | Status: SHIPPED | OUTPATIENT
Start: 2024-04-04 | End: 2024-04-04

## 2024-04-04 NOTE — PATIENT INSTRUCTIONS
Medicare Preventive Visit Patient Instructions  Thank you for completing your Welcome to Medicare Visit or Medicare Annual Wellness Visit today. Your next wellness visit will be due in one year (4/5/2025).  The screening/preventive services that you may require over the next 5-10 years are detailed below. Some tests may not apply to you based off risk factors and/or age. Screening tests ordered at today's visit but not completed yet may show as past due. Also, please note that scanned in results may not display below.  Preventive Screenings:  Service Recommendations Previous Testing/Comments   Colorectal Cancer Screening  * Colonoscopy    * Fecal Occult Blood Test (FOBT)/Fecal Immunochemical Test (FIT)  * Fecal DNA/Cologuard Test  * Flexible Sigmoidoscopy Age: 45-75 years old   Colonoscopy: every 10 years (may be performed more frequently if at higher risk)  OR  FOBT/FIT: every 1 year  OR  Cologuard: every 3 years  OR  Sigmoidoscopy: every 5 years  Screening may be recommended earlier than age 45 if at higher risk for colorectal cancer. Also, an individualized decision between you and your healthcare provider will decide whether screening between the ages of 76-85 would be appropriate. Colonoscopy: 08/09/2021  FOBT/FIT: Not on file  Cologuard: Not on file  Sigmoidoscopy: Not on file    Screening Current     Breast Cancer Screening Age: 40+ years old  Frequency: every 1-2 years  Not required if history of left and right mastectomy Mammogram: 09/15/2023    Screening Current   Cervical Cancer Screening Between the ages of 21-29, pap smear recommended once every 3 years.   Between the ages of 30-65, can perform pap smear with HPV co-testing every 5 years.   Recommendations may differ for women with a history of total hysterectomy, cervical cancer, or abnormal pap smears in past. Pap Smear: 06/13/2022    Screening Not Indicated   Hepatitis C Screening Once for adults born between 1945 and 1965  More frequently in  patients at high risk for Hepatitis C Hep C Antibody: 03/29/2021    Screening Current   Diabetes Screening 1-2 times per year if you're at risk for diabetes or have pre-diabetes Fasting glucose: 85 mg/dL (11/9/2023)  A1C: No results in last 5 years (No results in last 5 years)  Screening Current   Cholesterol Screening Once every 5 years if you don't have a lipid disorder. May order more often based on risk factors. Lipid panel: 11/09/2023    Screening Not Indicated  History Lipid Disorder     Other Preventive Screenings Covered by Medicare:  Abdominal Aortic Aneurysm (AAA) Screening: covered once if your at risk. You're considered to be at risk if you have a family history of AAA.  Lung Cancer Screening: covers low dose CT scan once per year if you meet all of the following conditions: (1) Age 55-77; (2) No signs or symptoms of lung cancer; (3) Current smoker or have quit smoking within the last 15 years; (4) You have a tobacco smoking history of at least 20 pack years (packs per day multiplied by number of years you smoked); (5) You get a written order from a healthcare provider.  Glaucoma Screening: covered annually if you're considered high risk: (1) You have diabetes OR (2) Family history of glaucoma OR (3)  aged 50 and older OR (4)  American aged 65 and older  Osteoporosis Screening: covered every 2 years if you meet one of the following conditions: (1) You're estrogen deficient and at risk for osteoporosis based off medical history and other findings; (2) Have a vertebral abnormality; (3) On glucocorticoid therapy for more than 3 months; (4) Have primary hyperparathyroidism; (5) On osteoporosis medications and need to assess response to drug therapy.   Last bone density test (DXA Scan): 09/15/2023.  HIV Screening: covered annually if you're between the age of 15-65. Also covered annually if you are younger than 15 and older than 65 with risk factors for HIV infection. For pregnant  patients, it is covered up to 3 times per pregnancy.    Immunizations:  Immunization Recommendations   Influenza Vaccine Annual influenza vaccination during flu season is recommended for all persons aged >= 6 months who do not have contraindications   Pneumococcal Vaccine   * Pneumococcal conjugate vaccine = PCV13 (Prevnar 13), PCV15 (Vaxneuvance), PCV20 (Prevnar 20)  * Pneumococcal polysaccharide vaccine = PPSV23 (Pneumovax) Adults 19-63 yo with certain risk factors or if 65+ yo  If never received any pneumonia vaccine: recommend Prevnar 20 (PCV20)  Give PCV20 if previously received 1 dose of PCV13 or PPSV23   Hepatitis B Vaccine 3 dose series if at intermediate or high risk (ex: diabetes, end stage renal disease, liver disease)   Respiratory syncytial virus (RSV) Vaccine - COVERED BY MEDICARE PART D  * RSVPreF3 (Arexvy) CDC recommends that adults 60 years of age and older may receive a single dose of RSV vaccine using shared clinical decision-making (SCDM)   Tetanus (Td) Vaccine - COST NOT COVERED BY MEDICARE PART B Following completion of primary series, a booster dose should be given every 10 years to maintain immunity against tetanus. Td may also be given as tetanus wound prophylaxis.   Tdap Vaccine - COST NOT COVERED BY MEDICARE PART B Recommended at least once for all adults. For pregnant patients, recommended with each pregnancy.   Shingles Vaccine (Shingrix) - COST NOT COVERED BY MEDICARE PART B  2 shot series recommended in those 19 years and older who have or will have weakened immune systems or those 50 years and older     Health Maintenance Due:      Topic Date Due   • Breast Cancer Screening: Mammogram  09/15/2024   • Colorectal Cancer Screening  08/09/2026   • Hepatitis C Screening  Completed     Immunizations Due:      Topic Date Due   • COVID-19 Vaccine (5 - 2023-24 season) 09/01/2023     Advance Directives   What are advance directives?  Advance directives are legal documents that state your wishes  and plans for medical care. These plans are made ahead of time in case you lose your ability to make decisions for yourself. Advance directives can apply to any medical decision, such as the treatments you want, and if you want to donate organs.   What are the types of advance directives?  There are many types of advance directives, and each state has rules about how to use them. You may choose a combination of any of the following:  Living will:  This is a written record of the treatment you want. You can also choose which treatments you do not want, which to limit, and which to stop at a certain time. This includes surgery, medicine, IV fluid, and tube feedings.   Durable power of  for healthcare (DPAHC):  This is a written record that states who you want to make healthcare choices for you when you are unable to make them for yourself. This person, called a proxy, is usually a family member or a friend. You may choose more than 1 proxy.  Do not resuscitate (DNR) order:  A DNR order is used in case your heart stops beating or you stop breathing. It is a request not to have certain forms of treatment, such as CPR. A DNR order may be included in other types of advance directives.  Medical directive:  This covers the care that you want if you are in a coma, near death, or unable to make decisions for yourself. You can list the treatments you want for each condition. Treatment may include pain medicine, surgery, blood transfusions, dialysis, IV or tube feedings, and a ventilator (breathing machine).  Values history:  This document has questions about your views, beliefs, and how you feel and think about life. This information can help others choose the care that you would choose.  Why are advance directives important?  An advance directive helps you control your care. Although spoken wishes may be used, it is better to have your wishes written down. Spoken wishes can be misunderstood, or not followed.  Treatments may be given even if you do not want them. An advance directive may make it easier for your family to make difficult choices about your care.   Weight Management   Why it is important to manage your weight:  Being overweight increases your risk of health conditions such as heart disease, high blood pressure, type 2 diabetes, and certain types of cancer. It can also increase your risk for osteoarthritis, sleep apnea, and other respiratory problems. Aim for a slow, steady weight loss. Even a small amount of weight loss can lower your risk of health problems.  How to lose weight safely:  A safe and healthy way to lose weight is to eat fewer calories and get regular exercise. You can lose up about 1 pound a week by decreasing the number of calories you eat by 500 calories each day.   Healthy meal plan for weight management:  A healthy meal plan includes a variety of foods, contains fewer calories, and helps you stay healthy. A healthy meal plan includes the following:  Eat whole-grain foods more often.  A healthy meal plan should contain fiber. Fiber is the part of grains, fruits, and vegetables that is not broken down by your body. Whole-grain foods are healthy and provide extra fiber in your diet. Some examples of whole-grain foods are whole-wheat breads and pastas, oatmeal, brown rice, and bulgur.  Eat a variety of vegetables every day.  Include dark, leafy greens such as spinach, kale, karri greens, and mustard greens. Eat yellow and orange vegetables such as carrots, sweet potatoes, and winter squash.   Eat a variety of fruits every day.  Choose fresh or canned fruit (canned in its own juice or light syrup) instead of juice. Fruit juice has very little or no fiber.  Eat low-fat dairy foods.  Drink fat-free (skim) milk or 1% milk. Eat fat-free yogurt and low-fat cottage cheese. Try low-fat cheeses such as mozzarella and other reduced-fat cheeses.  Choose meat and other protein foods that are low in fat.   Choose beans or other legumes such as split peas or lentils. Choose fish, skinless poultry (chicken or turkey), or lean cuts of red meat (beef or pork). Before you cook meat or poultry, cut off any visible fat.   Use less fat and oil.  Try baking foods instead of frying them. Add less fat, such as margarine, sour cream, regular salad dressing and mayonnaise to foods. Eat fewer high-fat foods. Some examples of high-fat foods include french fries, doughnuts, ice cream, and cakes.  Eat fewer sweets.  Limit foods and drinks that are high in sugar. This includes candy, cookies, regular soda, and sweetened drinks.  Exercise:  Exercise at least 30 minutes per day on most days of the week. Some examples of exercise include walking, biking, dancing, and swimming. You can also fit in more physical activity by taking the stairs instead of the elevator or parking farther away from stores. Ask your healthcare provider about the best exercise plan for you.      © Copyright [employer] 2018 Information is for End User's use only and may not be sold, redistributed or otherwise used for commercial purposes. All illustrations and images included in CareNotes® are the copyrighted property of [employer] or [employer]

## 2024-04-04 NOTE — PROGRESS NOTES
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  Denies any SOB, chest pain or PND.  Will do BMP/BNP.  Echo from 2021 reviewed unremarkable.  Empirical trial of Lasix for 1 to 2 weeks, follow-up with symptoms are not improved in 1 month or additional symptoms develop.          Depression Screening and Follow-up Plan: Patient was screened for depression during today's encounter. They screened negative with a PHQ-2 score of 0.      Preventive health issues were discussed with patient, and age appropriate screening tests were ordered as noted in patient's After Visit Summary.  Personalized health advice and appropriate referrals for health education or preventive services given if needed, as noted in patient's After Visit Summary.     History of Present Illness:     Patient presents for a Medicare Wellness Visit    HPI   Patient Care Team:  Sanford Davenport MD as PCP - General  Lee Mary MD     Review of Systems:     Review of Systems   Constitutional:  Negative for appetite change, chills, diaphoresis, fatigue, fever and unexpected weight change.   Respiratory:  Negative for apnea, cough, choking, chest tightness, shortness of breath, wheezing and stridor.    Cardiovascular:  Negative for chest pain, palpitations and leg swelling.    Gastrointestinal:  Negative for abdominal distention, abdominal pain, anal bleeding, blood in stool, constipation, diarrhea, nausea and vomiting.   Genitourinary:  Negative for decreased urine volume, difficulty urinating, frequency and urgency.   Musculoskeletal:  Negative for arthralgias, back pain and myalgias.   Neurological:  Negative for dizziness, light-headedness, numbness and headaches.        Problem List:     Patient Active Problem List   Diagnosis    Generalized abdominal pain    IBS (irritable bowel syndrome)    T-cell lymphoma (HCC)    Intervertebral disc disorder with radiculopathy of lumbar region    Mycosis fungoides, extranodal and solid organ sites (HCC)      Past Medical and Surgical History:     Past Medical History:   Diagnosis Date    Dizziness     Hyperlipidemia     Mild    Lung mass     Lymphoma (HCC)     Menopause     Uterine leiomyoma      Past Surgical History:   Procedure Laterality Date    COLONOSCOPY      complete    ENDOMETRIAL BIOPSY      without cervical dilation    LAPAROSCOPY      (diagnostic)    LEG SURGERY      back of the leg     TONSILLECTOMY      TUBAL LIGATION        Family History:     Family History   Problem Relation Age of Onset    Dementia Mother     Cancer Maternal Aunt     Breast cancer Other     Heart disease Father     No Known Problems Daughter     No Known Problems Maternal Grandmother     Leukemia Maternal Grandfather     No Known Problems Paternal Grandmother     No Known Problems Paternal Grandfather     Breast cancer additional onset Cousin 34    No Known Problems Paternal Aunt     No Known Problems Paternal Aunt     No Known Problems Paternal Aunt       Social History:     Social History     Socioeconomic History    Marital status: /Civil Union     Spouse name: None    Number of children: None    Years of education: None    Highest education level: None   Occupational History    Occupation: retired   Tobacco Use    Smoking status: Former     Types:  Cigarettes    Smokeless tobacco: Never    Tobacco comments:     30 years since last smoked    Vaping Use    Vaping status: Never Used   Substance and Sexual Activity    Alcohol use: Yes     Comment: Occasionally    Drug use: No    Sexual activity: Yes     Partners: Male     Birth control/protection: Post-menopausal   Other Topics Concern    None   Social History Narrative    Activities-Treadmill    Daily coffee consumption (1 cups/day)    Exercise: walking    Exercising regularly     Social Determinants of Health     Financial Resource Strain: Low Risk  (3/30/2023)    Overall Financial Resource Strain (CARDIA)     Difficulty of Paying Living Expenses: Not hard at all   Food Insecurity: No Food Insecurity (4/4/2024)    Hunger Vital Sign     Worried About Running Out of Food in the Last Year: Never true     Ran Out of Food in the Last Year: Never true   Transportation Needs: No Transportation Needs (4/4/2024)    PRAPARE - Transportation     Lack of Transportation (Medical): No     Lack of Transportation (Non-Medical): No   Physical Activity: Not on file   Stress: Not on file   Social Connections: Not on file   Intimate Partner Violence: Not on file   Housing Stability: Low Risk  (4/4/2024)    Housing Stability Vital Sign     Unable to Pay for Housing in the Last Year: No     Number of Places Lived in the Last Year: 1     Unstable Housing in the Last Year: No      Medications and Allergies:     Current Outpatient Medications   Medication Sig Dispense Refill    Calcium 500 MG tablet Take 2 tablets by mouth daily 600mg       Cyanocobalamin (B-12) 1000 MCG CAPS Take 1 capsule by mouth daily      furosemide (LASIX) 20 mg tablet Take 1 tablet (20 mg total) by mouth daily for 15 days 15 tablet 0    Multiple Vitamin (MULTI-VITAMIN DAILY) TABS Take 1 tablet by mouth daily      Omega-3 Fatty Acids (FISH OIL) 645 MG CAPS Take 1 capsule by mouth daily      pregabalin (LYRICA) 75 mg capsule Take 1 capsule (75 mg total) by mouth 3  (three) times a day 90 capsule 2    Pyridoxine HCl (BL VITAMIN B-6 PO) Take 1 tablet by mouth daily      sodium chloride (OCEAN) 0.65 % nasal spray 1 spray into each nostril every 2 (two) hours while awake 60 mL 3    Zoster Vac Recomb Adjuvanted (Shingrix) 50 MCG/0.5ML SUSR Inject 0.5 mL into a muscle once for 1 dose Repeat dose in 2 to 6 months 1 each 1     No current facility-administered medications for this visit.     No Known Allergies   Immunizations:     Immunization History   Administered Date(s) Administered    COVID-19 PFIZER VACCINE 0.3 ML IM 03/05/2021, 03/26/2021, 10/01/2021, 06/29/2022    INFLUENZA 10/05/2021    Influenza, seasonal, injectable 01/01/2014    Pneumococcal Conjugate 13-Valent 01/01/2016    Pneumococcal Conjugate Vaccine 20-valent (Pcv20), Polysace 03/30/2023      Health Maintenance:         Topic Date Due    Breast Cancer Screening: Mammogram  09/15/2024    Colorectal Cancer Screening  08/09/2026    Hepatitis C Screening  Completed         Topic Date Due    COVID-19 Vaccine (5 - 2023-24 season) 09/01/2023      Medicare Screening Tests and Risk Assessments:     Annalisa is here for her Subsequent Wellness visit. Last Medicare Wellness visit information reviewed, patient interviewed and updates made to the record today.      Health Risk Assessment:   Patient rates overall health as very good. Patient feels that their physical health rating is same. Patient is very satisfied with their life. Eyesight was rated as same. Hearing was rated as same. Patient feels that their emotional and mental health rating is same. Patients states they are sometimes angry. Patient states they are never, rarely unusually tired/fatigued. Pain experienced in the last 7 days has been none. Patient states that she has experienced no weight loss or gain in last 6 months.     Depression Screening:   PHQ-2 Score: 0      Fall Risk Screening:   In the past year, patient has experienced: no history of falling in past year       Urinary Incontinence Screening:   Patient has not leaked urine accidently in the last six months.     Home Safety:  Patient does not have trouble with stairs inside or outside of their home. Patient has working smoke alarms and has no working carbon monoxide detector. Home safety hazards include: none.     Nutrition:   Current diet is Regular and Limited junk food.     Medications:   Patient is currently taking over-the-counter supplements. OTC medications include: see medication list. Patient is able to manage medications.     Activities of Daily Living (ADLs)/Instrumental Activities of Daily Living (IADLs):   Walk and transfer into and out of bed and chair?: Yes  Dress and groom yourself?: Yes    Bathe or shower yourself?: Yes    Feed yourself? Yes  Do your laundry/housekeeping?: Yes  Manage your money, pay your bills and track your expenses?: Yes  Make your own meals?: Yes    Do your own shopping?: Yes    Previous Hospitalizations:   Any hospitalizations or ED visits within the last 12 months?: No      Advance Care Planning:   Living will: Yes    Durable POA for healthcare: Yes    Advanced directive: Yes      Cognitive Screening:   Provider or family/friend/caregiver concerned regarding cognition?: No    PREVENTIVE SCREENINGS      Cardiovascular Screening:    General: Screening Not Indicated and History Lipid Disorder      Diabetes Screening:     General: Screening Current      Colorectal Cancer Screening:     General: Screening Current      Breast Cancer Screening:     General: Screening Current      Cervical Cancer Screening:    General: Screening Not Indicated and Screening Current      Osteoporosis Screening:    General: Risks and Benefits Discussed and Screening Current      Abdominal Aortic Aneurysm (AAA) Screening:        General: Screening Not Indicated      Lung Cancer Screening:     General: Screening Not Indicated      Hepatitis C Screening:    General: Screening Current    Screening, Brief  "Intervention, and Referral to Treatment (SBIRT)    Screening  Typical number of drinks in a day: 0  Typical number of drinks in a week: 0  Interpretation: Low risk drinking behavior.    Single Item Drug Screening:  How often have you used an illegal drug (including marijuana) or a prescription medication for non-medical reasons in the past year? never    Single Item Drug Screen Score: 0  Interpretation: Negative screen for possible drug use disorder    Other Counseling Topics:   Regular weightbearing exercise and calcium and vitamin D intake.     No results found.     Physical Exam:     /74 (BP Location: Left arm, Patient Position: Sitting, Cuff Size: Standard)   Pulse 72   Temp 98.5 °F (36.9 °C) (Temporal)   Ht 4' 10\" (1.473 m)   Wt 60.3 kg (133 lb)   SpO2 99%   BMI 27.80 kg/m²     Physical Exam  Constitutional:       General: She is not in acute distress.     Appearance: Normal appearance. She is normal weight. She is not ill-appearing, toxic-appearing or diaphoretic.   Neck:      Thyroid: Thyromegaly present.      Vascular: No carotid bruit or JVD.   Cardiovascular:      Rate and Rhythm: Normal rate and regular rhythm.      Pulses: Normal pulses.      Heart sounds: Normal heart sounds. No murmur heard.     No gallop.   Pulmonary:      Effort: Pulmonary effort is normal. No respiratory distress.      Breath sounds: Normal breath sounds. No stridor. No wheezing, rhonchi or rales.   Chest:      Chest wall: No tenderness.   Abdominal:      General: There is no distension.      Palpations: Abdomen is soft. There is no mass.   Musculoskeletal:      Right lower leg: Edema present.      Left lower leg: Edema present.   Neurological:      Mental Status: She is alert and oriented to person, place, and time.          Alfredito Magana MD  "

## 2024-04-05 ENCOUNTER — APPOINTMENT (OUTPATIENT)
Dept: LAB | Facility: CLINIC | Age: 79
End: 2024-04-05
Payer: MEDICARE

## 2024-04-05 DIAGNOSIS — M79.89 LEG SWELLING: ICD-10-CM

## 2024-04-05 LAB
ANION GAP SERPL CALCULATED.3IONS-SCNC: 10 MMOL/L (ref 4–13)
BNP SERPL-MCNC: 50 PG/ML (ref 0–100)
BUN SERPL-MCNC: 24 MG/DL (ref 5–25)
CALCIUM SERPL-MCNC: 9.6 MG/DL (ref 8.4–10.2)
CHLORIDE SERPL-SCNC: 103 MMOL/L (ref 96–108)
CO2 SERPL-SCNC: 28 MMOL/L (ref 21–32)
CREAT SERPL-MCNC: 0.81 MG/DL (ref 0.6–1.3)
GFR SERPL CREATININE-BSD FRML MDRD: 70 ML/MIN/1.73SQ M
GLUCOSE SERPL-MCNC: 107 MG/DL (ref 65–140)
POTASSIUM SERPL-SCNC: 4.7 MMOL/L (ref 3.5–5.3)
SODIUM SERPL-SCNC: 141 MMOL/L (ref 135–147)

## 2024-04-05 PROCEDURE — 36415 COLL VENOUS BLD VENIPUNCTURE: CPT

## 2024-04-05 PROCEDURE — 83880 ASSAY OF NATRIURETIC PEPTIDE: CPT

## 2024-04-05 PROCEDURE — 80048 BASIC METABOLIC PNL TOTAL CA: CPT

## 2024-05-07 ENCOUNTER — OFFICE VISIT (OUTPATIENT)
Dept: INTERNAL MEDICINE CLINIC | Facility: CLINIC | Age: 79
End: 2024-05-07
Payer: MEDICARE

## 2024-05-07 VITALS
HEART RATE: 72 BPM | TEMPERATURE: 98 F | DIASTOLIC BLOOD PRESSURE: 69 MMHG | WEIGHT: 130 LBS | OXYGEN SATURATION: 99 % | HEIGHT: 58 IN | SYSTOLIC BLOOD PRESSURE: 142 MMHG | BODY MASS INDEX: 27.29 KG/M2

## 2024-05-07 DIAGNOSIS — E78.2 MIXED HYPERLIPIDEMIA: ICD-10-CM

## 2024-05-07 DIAGNOSIS — M79.89 LEG SWELLING: ICD-10-CM

## 2024-05-07 DIAGNOSIS — I87.2 VENOUS INCOMPETENCE: Primary | ICD-10-CM

## 2024-05-07 DIAGNOSIS — M51.16 INTERVERTEBRAL DISC DISORDER WITH RADICULOPATHY OF LUMBAR REGION: ICD-10-CM

## 2024-05-07 PROCEDURE — 99213 OFFICE O/P EST LOW 20 MIN: CPT | Performed by: INTERNAL MEDICINE

## 2024-05-07 PROCEDURE — G2211 COMPLEX E/M VISIT ADD ON: HCPCS | Performed by: INTERNAL MEDICINE

## 2024-05-07 RX ORDER — FUROSEMIDE 20 MG/1
20 TABLET ORAL DAILY
Qty: 30 TABLET | Refills: 1 | Status: SHIPPED | OUTPATIENT
Start: 2024-05-07 | End: 2024-07-06

## 2024-05-07 NOTE — PROGRESS NOTES
Assessment/Plan:    Diagnoses and all orders for this visit:    Intervertebral disc disorder with radiculopathy of lumbar region  -     Comprehensive metabolic panel; Future  -     CBC (Includes Diff/Plt) (Refl); Future  -     Lipid panel; Future    Leg swelling  -     furosemide (LASIX) 20 mg tablet; Take 1 tablet (20 mg total) by mouth daily  -     Comprehensive metabolic panel; Future  -     CBC (Includes Diff/Plt) (Refl); Future  -     Lipid panel; Future    Mixed hyperlipidemia  -     Comprehensive metabolic panel; Future  -     CBC (Includes Diff/Plt) (Refl); Future  -     Lipid panel; Future       Right greater than left leg swelling for the last 1 month, improved with as needed Lasix  Venous incompetence-recommend compression stocking, lower extremity limb elevation, as needed Lasix, baseline weight of 130, recommended Lasix for 2 pound weight gain in 24 hours time.  Recommend low-salt diet.  Follow-up as needed       There are no Patient Instructions on file for this visit.    Subjective:      Patient ID: Annalisa Malone is a 79 y.o. female    HPI      Current Outpatient Medications:     Calcium 500 MG tablet, Take 2 tablets by mouth daily 600mg , Disp: , Rfl:     Cyanocobalamin (B-12) 1000 MCG CAPS, Take 1 capsule by mouth daily, Disp: , Rfl:     furosemide (LASIX) 20 mg tablet, Take 1 tablet (20 mg total) by mouth daily, Disp: 30 tablet, Rfl: 1    Multiple Vitamin (MULTI-VITAMIN DAILY) TABS, Take 1 tablet by mouth daily, Disp: , Rfl:     Omega-3 Fatty Acids (FISH OIL) 645 MG CAPS, Take 1 capsule by mouth daily, Disp: , Rfl:     pregabalin (LYRICA) 75 mg capsule, Take 1 capsule (75 mg total) by mouth 3 (three) times a day, Disp: 90 capsule, Rfl: 2    Pyridoxine HCl (BL VITAMIN B-6 PO), Take 1 tablet by mouth daily, Disp: , Rfl:     sodium chloride (OCEAN) 0.65 % nasal spray, 1 spray into each nostril every 2 (two) hours while awake, Disp: 60 mL, Rfl: 3     Past Medical History:   Diagnosis Date     Dizziness     Hyperlipidemia     Mild    Lung mass     Lymphoma (HCC)     Menopause     Uterine leiomyoma          Past Surgical History:   Procedure Laterality Date    COLONOSCOPY      complete    ENDOMETRIAL BIOPSY      without cervical dilation    LAPAROSCOPY      (diagnostic)    LEG SURGERY      back of the leg     TONSILLECTOMY      TUBAL LIGATION           No Known Allergies    Recent Results (from the past 1008 hour(s))   Basic metabolic panel    Collection Time: 04/05/24 10:44 AM   Result Value Ref Range    Sodium 141 135 - 147 mmol/L    Potassium 4.7 3.5 - 5.3 mmol/L    Chloride 103 96 - 108 mmol/L    CO2 28 21 - 32 mmol/L    ANION GAP 10 4 - 13 mmol/L    BUN 24 5 - 25 mg/dL    Creatinine 0.81 0.60 - 1.30 mg/dL    Glucose 107 65 - 140 mg/dL    Calcium 9.6 8.4 - 10.2 mg/dL    eGFR 70 ml/min/1.73sq m   B-Type Natriuretic Peptide(BNP)    Collection Time: 04/05/24 10:44 AM   Result Value Ref Range    BNP 50 0 - 100 pg/mL       The following portions of the patient's history were reviewed and updated as appropriate: allergies, current medications, past family history, past medical history, past social history, past surgical history and problem list.     Review of Systems   Constitutional:  Negative for appetite change, chills, diaphoresis, fatigue, fever and unexpected weight change.   Respiratory:  Negative for apnea, cough, choking, chest tightness, shortness of breath, wheezing and stridor.    Cardiovascular:  Positive for leg swelling. Negative for chest pain and palpitations.   Gastrointestinal:  Negative for abdominal distention, abdominal pain, anal bleeding, blood in stool, constipation, diarrhea, nausea and vomiting.   Genitourinary:  Negative for decreased urine volume, difficulty urinating, frequency and urgency.   Musculoskeletal:  Negative for arthralgias, back pain and myalgias.   Neurological:  Negative for dizziness, light-headedness, numbness and headaches.         Objective:      Vitals:     05/07/24 1520   BP: 142/69   Pulse: 72   Temp: 98 °F (36.7 °C)   SpO2: 99%          Physical Exam  Vitals reviewed.   Constitutional:       General: She is not in acute distress.     Appearance: Normal appearance. She is not ill-appearing, toxic-appearing or diaphoretic.   HENT:      Mouth/Throat:      Mouth: Mucous membranes are moist.   Cardiovascular:      Rate and Rhythm: Normal rate and regular rhythm.      Pulses: Normal pulses.      Heart sounds: Normal heart sounds. No murmur heard.     No friction rub. No gallop.   Pulmonary:      Effort: Pulmonary effort is normal. No respiratory distress.      Breath sounds: Normal breath sounds. No stridor. No wheezing, rhonchi or rales.   Chest:      Chest wall: No tenderness.   Musculoskeletal:      Right lower leg: Edema present.      Left lower leg: Edema present.   Skin:     General: Skin is warm and dry.      Findings: No lesion or rash.   Neurological:      Mental Status: She is alert.

## 2024-05-09 ENCOUNTER — TELEPHONE (OUTPATIENT)
Age: 79
End: 2024-05-09

## 2024-05-09 DIAGNOSIS — I87.2 VENOUS INCOMPETENCE: Primary | ICD-10-CM

## 2024-05-09 NOTE — TELEPHONE ENCOUNTER
Patient would like doctor to order compression stocking for her.. Too many options not sure what compression it would be.   Please call her back about this.

## 2024-06-18 ENCOUNTER — ANNUAL EXAM (OUTPATIENT)
Dept: OBGYN CLINIC | Facility: CLINIC | Age: 79
End: 2024-06-18
Payer: MEDICARE

## 2024-06-18 VITALS
DIASTOLIC BLOOD PRESSURE: 74 MMHG | WEIGHT: 127 LBS | HEIGHT: 57 IN | SYSTOLIC BLOOD PRESSURE: 136 MMHG | BODY MASS INDEX: 27.4 KG/M2

## 2024-06-18 DIAGNOSIS — Z12.31 ENCOUNTER FOR SCREENING MAMMOGRAM FOR MALIGNANT NEOPLASM OF BREAST: ICD-10-CM

## 2024-06-18 DIAGNOSIS — Z01.419 ENCOUNTER FOR GYNECOLOGICAL EXAMINATION WITHOUT ABNORMAL FINDING: Primary | ICD-10-CM

## 2024-06-18 PROCEDURE — G0101 CA SCREEN;PELVIC/BREAST EXAM: HCPCS | Performed by: OBSTETRICS & GYNECOLOGY

## 2024-06-18 NOTE — PROGRESS NOTES
Annalisa TRAMMELL Faisal   1945    CC:  Yearly exam    S:  79 y.o. female here for yearly exam. She is postmenopausal and has had no vaginal bleeding.  She denies vaginal discharge, itching, odor or dryness.     Sexual activity: She is sexually active without pain, bleeding or dryness.     Last Pap: 5/17/2021 - normal/negative HPV  Last Mammo: 9/15/2023 - BIRAD-2  Last Colonoscopy: 8/9/2021 - normal; repeat 5 years  Last DEXA: 9/15/2023 - osteopenia (t-score -2.1); repeat 2 years    We reviewed ASCCP guidelines for Pap testing.     Family hx of breast cancer: cousin  Family hx of ovarian cancer: no  Family hx of colon cancer: no      Current Outpatient Medications:     Calcium 500 MG tablet, Take 2 tablets by mouth daily 600mg , Disp: , Rfl:     Cyanocobalamin (B-12) 1000 MCG CAPS, Take 1 capsule by mouth daily, Disp: , Rfl:     furosemide (LASIX) 20 mg tablet, Take 1 tablet (20 mg total) by mouth daily, Disp: 30 tablet, Rfl: 1    Multiple Vitamin (MULTI-VITAMIN DAILY) TABS, Take 1 tablet by mouth daily, Disp: , Rfl:     Omega-3 Fatty Acids (FISH OIL) 645 MG CAPS, Take 1 capsule by mouth daily, Disp: , Rfl:     pregabalin (LYRICA) 75 mg capsule, Take 1 capsule (75 mg total) by mouth 3 (three) times a day, Disp: 90 capsule, Rfl: 2    Pyridoxine HCl (BL VITAMIN B-6 PO), Take 1 tablet by mouth daily, Disp: , Rfl:     sodium chloride (OCEAN) 0.65 % nasal spray, 1 spray into each nostril every 2 (two) hours while awake, Disp: 60 mL, Rfl: 3  Social History     Socioeconomic History    Marital status: /Civil Union     Spouse name: Not on file    Number of children: Not on file    Years of education: Not on file    Highest education level: Not on file   Occupational History    Occupation: retired   Tobacco Use    Smoking status: Former     Current packs/day: 2.00     Average packs/day: 2.0 packs/day for 15.0 years (30.0 ttl pk-yrs)     Types: Cigarettes    Smokeless tobacco: Never    Tobacco comments:     30 years  since last smoked    Vaping Use    Vaping status: Never Used   Substance and Sexual Activity    Alcohol use: Yes     Comment: Occasionally    Drug use: No    Sexual activity: Yes     Partners: Male     Birth control/protection: Post-menopausal, Female Sterilization   Other Topics Concern    Not on file   Social History Narrative    Activities-Treadmill    Daily coffee consumption (1 cups/day)    Exercise: walking    Exercising regularly     Social Determinants of Health     Financial Resource Strain: Low Risk  (3/30/2023)    Overall Financial Resource Strain (CARDIA)     Difficulty of Paying Living Expenses: Not hard at all   Food Insecurity: No Food Insecurity (4/4/2024)    Hunger Vital Sign     Worried About Running Out of Food in the Last Year: Never true     Ran Out of Food in the Last Year: Never true   Transportation Needs: No Transportation Needs (4/4/2024)    PRAPARE - Transportation     Lack of Transportation (Medical): No     Lack of Transportation (Non-Medical): No   Physical Activity: Not on file   Stress: Not on file   Social Connections: Not on file   Intimate Partner Violence: Not on file   Housing Stability: Low Risk  (4/4/2024)    Housing Stability Vital Sign     Unable to Pay for Housing in the Last Year: No     Number of Times Moved in the Last Year: 1     Homeless in the Last Year: No     Family History   Problem Relation Age of Onset    Dementia Mother     Cancer Maternal Aunt     Breast cancer Other     Heart disease Father     No Known Problems Daughter     No Known Problems Maternal Grandmother     Leukemia Maternal Grandfather     No Known Problems Paternal Grandmother     No Known Problems Paternal Grandfather     Breast cancer additional onset Cousin 34    No Known Problems Paternal Aunt     No Known Problems Paternal Aunt     No Known Problems Paternal Aunt      Past Medical History:   Diagnosis Date    Dizziness     Hyperlipidemia     Mild    Lung mass     Lymphoma (HCC)     Menopause   "   Uterine leiomyoma         Review of Systems   Respiratory: Negative.    Cardiovascular: Negative.    Gastrointestinal: Negative for constipation and diarrhea.   Genitourinary: Negative for difficulty urinating, pelvic pain, vaginal bleeding, vaginal discharge, itching or odor.    O:  Blood pressure 136/74, height 4' 9\" (1.448 m), weight 57.6 kg (127 lb), not currently breastfeeding.    Patient appears well and is not in distress  Neck is supple without masses  Breasts are symmetrical without mass, tenderness, nipple discharge, skin changes or adenopathy.   Abdomen is soft and nontender without masses.   External genitals are normal without lesions or rashes.  Urethral meatus and urethra are normal  Bladder is normal to palpation  Vagina is normal without discharge or bleeding.   Cervix is normal without discharge or lesion.   Uterus is normal, mobile, nontender without palpable mass.  Adnexa are normal, nontender, without palpable mass.     A:   Yearly exam.     P:   Pap no longer indicated  Mammo slip given   Colonoscopy due 2026   DEXA due 2025; discussed fall avoidance and continued daily walks    RTO one year for yearly exam or sooner as needed.     "

## 2024-09-23 ENCOUNTER — APPOINTMENT (OUTPATIENT)
Dept: LAB | Facility: CLINIC | Age: 79
End: 2024-09-23
Payer: MEDICARE

## 2024-09-23 ENCOUNTER — TRANSCRIBE ORDERS (OUTPATIENT)
Dept: LAB | Facility: CLINIC | Age: 79
End: 2024-09-23

## 2024-09-23 DIAGNOSIS — E78.2 MIXED HYPERLIPIDEMIA: ICD-10-CM

## 2024-09-23 DIAGNOSIS — M79.89 LEG SWELLING: ICD-10-CM

## 2024-09-23 DIAGNOSIS — M51.16 INTERVERTEBRAL DISC DISORDER WITH RADICULOPATHY OF LUMBAR REGION: ICD-10-CM

## 2024-09-23 DIAGNOSIS — M79.89 LEG SWELLING: Primary | ICD-10-CM

## 2024-09-23 LAB
ALBUMIN SERPL BCG-MCNC: 4.1 G/DL (ref 3.5–5)
ALP SERPL-CCNC: 53 U/L (ref 34–104)
ALT SERPL W P-5'-P-CCNC: 17 U/L (ref 7–52)
ANION GAP SERPL CALCULATED.3IONS-SCNC: 6 MMOL/L (ref 4–13)
AST SERPL W P-5'-P-CCNC: 25 U/L (ref 13–39)
BILIRUB SERPL-MCNC: 0.52 MG/DL (ref 0.2–1)
BUN SERPL-MCNC: 20 MG/DL (ref 5–25)
CALCIUM SERPL-MCNC: 9.3 MG/DL (ref 8.4–10.2)
CHLORIDE SERPL-SCNC: 105 MMOL/L (ref 96–108)
CHOLEST SERPL-MCNC: 207 MG/DL
CO2 SERPL-SCNC: 30 MMOL/L (ref 21–32)
CREAT SERPL-MCNC: 0.66 MG/DL (ref 0.6–1.3)
ERYTHROCYTE [DISTWIDTH] IN BLOOD BY AUTOMATED COUNT: 14.3 % (ref 11.6–15.1)
GFR SERPL CREATININE-BSD FRML MDRD: 85 ML/MIN/1.73SQ M
GLUCOSE P FAST SERPL-MCNC: 86 MG/DL (ref 65–99)
HCT VFR BLD AUTO: 43 % (ref 34.8–46.1)
HDLC SERPL-MCNC: 69 MG/DL
HGB BLD-MCNC: 13.5 G/DL (ref 11.5–15.4)
LDLC SERPL CALC-MCNC: 123 MG/DL (ref 0–100)
MCH RBC QN AUTO: 28.9 PG (ref 26.8–34.3)
MCHC RBC AUTO-ENTMCNC: 31.4 G/DL (ref 31.4–37.4)
MCV RBC AUTO: 92 FL (ref 82–98)
NONHDLC SERPL-MCNC: 138 MG/DL
PLATELET # BLD AUTO: 213 THOUSANDS/UL (ref 149–390)
PMV BLD AUTO: 11.2 FL (ref 8.9–12.7)
POTASSIUM SERPL-SCNC: 4.7 MMOL/L (ref 3.5–5.3)
PROT SERPL-MCNC: 6.5 G/DL (ref 6.4–8.4)
RBC # BLD AUTO: 4.67 MILLION/UL (ref 3.81–5.12)
SODIUM SERPL-SCNC: 141 MMOL/L (ref 135–147)
TRIGL SERPL-MCNC: 76 MG/DL
WBC # BLD AUTO: 8.03 THOUSAND/UL (ref 4.31–10.16)

## 2024-09-23 PROCEDURE — 80053 COMPREHEN METABOLIC PANEL: CPT

## 2024-09-23 PROCEDURE — 85027 COMPLETE CBC AUTOMATED: CPT

## 2024-09-23 PROCEDURE — 80061 LIPID PANEL: CPT

## 2024-09-23 PROCEDURE — 36415 COLL VENOUS BLD VENIPUNCTURE: CPT

## 2024-09-26 ENCOUNTER — HOSPITAL ENCOUNTER (OUTPATIENT)
Dept: RADIOLOGY | Age: 79
Discharge: HOME | End: 2024-09-26
Payer: MEDICARE

## 2024-09-26 DIAGNOSIS — Z12.31 ENCOUNTER FOR SCREENING MAMMOGRAM FOR MALIGNANT NEOPLASM OF BREAST: ICD-10-CM

## 2024-09-26 PROCEDURE — 77063 BREAST TOMOSYNTHESIS BI: CPT

## 2024-09-26 PROCEDURE — 77067 SCR MAMMO BI INCL CAD: CPT

## 2024-10-02 ENCOUNTER — TELEPHONE (OUTPATIENT)
Dept: ADMINISTRATIVE | Facility: OTHER | Age: 79
End: 2024-10-02

## 2024-10-02 NOTE — TELEPHONE ENCOUNTER
10/02/24 1:48 PM    Patient contacted to bring Advance Directive, POLST, or Living Will document to next scheduled pcp visit.[facility] Department spoke with patient/ caregiver.    Thank you.  Gilda Whelan MA  [facility]

## 2024-10-08 ENCOUNTER — OFFICE VISIT (OUTPATIENT)
Dept: INTERNAL MEDICINE CLINIC | Facility: CLINIC | Age: 79
End: 2024-10-08
Payer: MEDICARE

## 2024-10-08 VITALS
SYSTOLIC BLOOD PRESSURE: 120 MMHG | BODY MASS INDEX: 27.61 KG/M2 | HEART RATE: 65 BPM | DIASTOLIC BLOOD PRESSURE: 80 MMHG | TEMPERATURE: 97.8 F | HEIGHT: 57 IN | OXYGEN SATURATION: 99 % | WEIGHT: 128 LBS

## 2024-10-08 DIAGNOSIS — M51.16 INTERVERTEBRAL DISC DISORDER WITH RADICULOPATHY OF LUMBAR REGION: ICD-10-CM

## 2024-10-08 DIAGNOSIS — Z91.09 ENVIRONMENTAL ALLERGIES: ICD-10-CM

## 2024-10-08 DIAGNOSIS — C84.09 MYCOSIS FUNGOIDES, EXTRANODAL AND SOLID ORGAN SITES (HCC): ICD-10-CM

## 2024-10-08 DIAGNOSIS — E78.2 MIXED HYPERLIPIDEMIA: ICD-10-CM

## 2024-10-08 DIAGNOSIS — I87.2 VENOUS INCOMPETENCE: Primary | ICD-10-CM

## 2024-10-08 PROCEDURE — 99214 OFFICE O/P EST MOD 30 MIN: CPT | Performed by: INTERNAL MEDICINE

## 2024-10-08 PROCEDURE — G2211 COMPLEX E/M VISIT ADD ON: HCPCS | Performed by: INTERNAL MEDICINE

## 2024-10-08 NOTE — ASSESSMENT & PLAN NOTE
No recent flareup, last TSF on 11/13/2023 by [facility] pain specialist, continue Lyrica  Orders:    Lipid panel; Future    CBC and differential; Future    Comprehensive metabolic panel; Future

## 2024-10-08 NOTE — ASSESSMENT & PLAN NOTE
Total cholesterol and LDL elevated, ASCVD of 22.4, wants to defer statin initiation at this time, recheck lipid panel in 6 months and follow-up  Orders:    Lipid panel; Future    CBC and differential; Future    Comprehensive metabolic panel; Future

## 2024-10-08 NOTE — ASSESSMENT & PLAN NOTE
Orders:    Lipid panel; Future    CBC and differential; Future    Comprehensive metabolic panel; Future

## 2024-10-08 NOTE — ASSESSMENT & PLAN NOTE
Improving, encouraged limb elevation, compression stockings, as needed Lasix  Orders:    Lipid panel; Future    CBC and differential; Future    Comprehensive metabolic panel; Future

## 2024-10-08 NOTE — PROGRESS NOTES
Ambulatory Visit  Name: Annalisa Malone      : 1945      MRN: [id number]  Encounter Provider: Alfredito Maagna MD  Encounter Date: 10/8/2024   Encounter department: [facility] INTERNAL MEDICINE    Assessment & Plan  Venous incompetence  Improving, encouraged limb elevation, compression stockings, as needed Lasix  Orders:    Lipid panel; Future    CBC and differential; Future    Comprehensive metabolic panel; Future    Mixed hyperlipidemia  Total cholesterol and LDL elevated, ASCVD of 22.4, wants to defer statin initiation at this time, recheck lipid panel in 6 months and follow-up  Orders:    Lipid panel; Future    CBC and differential; Future    Comprehensive metabolic panel; Future    Intervertebral disc disorder with radiculopathy of lumbar region  No recent flareup, last TSF on 2023 by [place] pain specialist, continue Lyrica  Orders:    Lipid panel; Future    CBC and differential; Future    Comprehensive metabolic panel; Future    Mycosis fungoides, extranodal and solid organ sites (HCC)    Orders:    Lipid panel; Future    CBC and differential; Future    Comprehensive metabolic panel; Future    Environmental allergies  Likely etiology of ongoing congestion  Continue OTC antihistamine            History of Present Illness     HPI  Patient comes for a follow-up, denies any back pain or leg swelling, recent labs reviewed and discussed with the patient        Review of Systems   Constitutional:  Negative for activity change, appetite change, chills, diaphoresis, fatigue, fever and unexpected weight change.   HENT:  Positive for congestion and sore throat. Negative for drooling, ear discharge, ear pain, facial swelling, hearing loss, postnasal drip, rhinorrhea, sinus pressure, sinus pain, sneezing, tinnitus, trouble swallowing and voice change.    Eyes:  Negative for discharge.   Respiratory:  Negative for apnea, cough, choking, chest tightness, shortness of breath, wheezing and stridor.     Cardiovascular:  Negative for chest pain, palpitations and leg swelling.   Gastrointestinal:  Negative for abdominal distention, abdominal pain, anal bleeding, blood in stool, constipation, diarrhea, nausea and vomiting.   Genitourinary:  Negative for decreased urine volume, difficulty urinating, frequency and urgency.   Musculoskeletal:  Negative for arthralgias, back pain and myalgias.   Skin:  Negative for color change.   Neurological:  Negative for dizziness, light-headedness, numbness and headaches.     Medical History Reviewed by provider this encounter:  Tobacco  Allergies  Meds  Problems  Med Hx  Surg Hx  Fam Hx       Past Medical History   Past Medical History:   Diagnosis Date    Dizziness     Hyperlipidemia     Mild    Lung mass     Lymphoma (HCC)     Menopause     Uterine leiomyoma      Past Surgical History:   Procedure Laterality Date    BREAST BIOPSY  2014    COLONOSCOPY      complete    ENDOMETRIAL BIOPSY      without cervical dilation    LAPAROSCOPY      (diagnostic)    LEG SURGERY      back of the leg     TONSILLECTOMY      TUBAL LIGATION       Family History   Problem Relation Age of Onset    Dementia Mother     Heart disease Father     No Known Problems Daughter     No Known Problems Maternal Grandmother     Leukemia Maternal Grandfather     No Known Problems Paternal Grandmother     No Known Problems Paternal Grandfather     Cancer Maternal Aunt     No Known Problems Paternal Aunt     No Known Problems Paternal Aunt     No Known Problems Paternal Aunt     Breast cancer additional onset Cousin 34    Breast cancer Other      Current Outpatient Medications on File Prior to Visit   Medication Sig Dispense Refill    Calcium 500 MG tablet Take 2 tablets by mouth daily 600mg       Cyanocobalamin (B-12) 1000 MCG CAPS Take 1 capsule by mouth daily      furosemide (LASIX) 20 mg tablet Take 1 tablet (20 mg total) by mouth daily (Patient taking differently: Take 20 mg by mouth if needed) 30 tablet  1    Multiple Vitamin (MULTI-VITAMIN DAILY) TABS Take 1 tablet by mouth daily      Omega-3 Fatty Acids (FISH OIL) 645 MG CAPS Take 1 capsule by mouth daily      pregabalin (LYRICA) 75 mg capsule Take 1 capsule (75 mg total) by mouth 3 (three) times a day 90 capsule 2    Pyridoxine HCl (BL VITAMIN B-6 PO) Take 1 tablet by mouth daily      [DISCONTINUED] sodium chloride (OCEAN) 0.65 % nasal spray 1 spray into each nostril every 2 (two) hours while awake 60 mL 3     No current facility-administered medications on file prior to visit.   No Known Allergies   Current Outpatient Medications on File Prior to Visit   Medication Sig Dispense Refill    Calcium 500 MG tablet Take 2 tablets by mouth daily 600mg       Cyanocobalamin (B-12) 1000 MCG CAPS Take 1 capsule by mouth daily      furosemide (LASIX) 20 mg tablet Take 1 tablet (20 mg total) by mouth daily (Patient taking differently: Take 20 mg by mouth if needed) 30 tablet 1    Multiple Vitamin (MULTI-VITAMIN DAILY) TABS Take 1 tablet by mouth daily      Omega-3 Fatty Acids (FISH OIL) 645 MG CAPS Take 1 capsule by mouth daily      pregabalin (LYRICA) 75 mg capsule Take 1 capsule (75 mg total) by mouth 3 (three) times a day 90 capsule 2    Pyridoxine HCl (BL VITAMIN B-6 PO) Take 1 tablet by mouth daily      [DISCONTINUED] sodium chloride (OCEAN) 0.65 % nasal spray 1 spray into each nostril every 2 (two) hours while awake 60 mL 3     No current facility-administered medications on file prior to visit.      Social History     Tobacco Use    Smoking status: Former     Current packs/day: 2.00     Average packs/day: 2.0 packs/day for 15.0 years (30.0 ttl pk-yrs)     Types: Cigarettes    Smokeless tobacco: Never    Tobacco comments:     30 years since last smoked    Vaping Use    Vaping status: Never Used   Substance and Sexual Activity    Alcohol use: Yes     Comment: Occasionally    Drug use: No    Sexual activity: Yes     Partners: Male     Birth control/protection:  "Post-menopausal, Female Sterilization         Objective     /80 (BP Location: Left arm, Patient Position: Sitting, Cuff Size: Adult)   Pulse 65   Temp 97.8 °F (36.6 °C) (Temporal)   Ht 4' 9\" (1.448 m)   Wt 58.1 kg (128 lb)   SpO2 99%   BMI 27.70 kg/m²     Physical Exam  Constitutional:       General: She is not in acute distress.     Appearance: Normal appearance. She is normal weight. She is not ill-appearing, toxic-appearing or diaphoretic.   Cardiovascular:      Rate and Rhythm: Normal rate and regular rhythm.      Pulses: Normal pulses.      Heart sounds: Normal heart sounds. No murmur heard.     No gallop.   Pulmonary:      Effort: Pulmonary effort is normal. No respiratory distress.      Breath sounds: Normal breath sounds. No stridor. No wheezing, rhonchi or rales.   Chest:      Chest wall: No tenderness.   Musculoskeletal:      Right lower leg: No edema.      Left lower leg: No edema.   Neurological:      Mental Status: She is alert and oriented to person, place, and time.         "

## 2025-03-31 ENCOUNTER — APPOINTMENT (OUTPATIENT)
Dept: LAB | Facility: CLINIC | Age: 80
End: 2025-03-31
Payer: MEDICARE

## 2025-03-31 DIAGNOSIS — C84.09 MYCOSIS FUNGOIDES, EXTRANODAL AND SOLID ORGAN SITES (HCC): ICD-10-CM

## 2025-03-31 DIAGNOSIS — M51.16 INTERVERTEBRAL DISC DISORDER WITH RADICULOPATHY OF LUMBAR REGION: ICD-10-CM

## 2025-03-31 DIAGNOSIS — I87.2 VENOUS INCOMPETENCE: ICD-10-CM

## 2025-03-31 DIAGNOSIS — E78.2 MIXED HYPERLIPIDEMIA: ICD-10-CM

## 2025-03-31 LAB
ALBUMIN SERPL BCG-MCNC: 3.9 G/DL (ref 3.5–5)
ALP SERPL-CCNC: 54 U/L (ref 34–104)
ALT SERPL W P-5'-P-CCNC: 16 U/L (ref 7–52)
ANION GAP SERPL CALCULATED.3IONS-SCNC: 5 MMOL/L (ref 4–13)
AST SERPL W P-5'-P-CCNC: 21 U/L (ref 13–39)
BASOPHILS # BLD AUTO: 0.06 THOUSANDS/ÂΜL (ref 0–0.1)
BASOPHILS NFR BLD AUTO: 1 % (ref 0–1)
BILIRUB SERPL-MCNC: 0.59 MG/DL (ref 0.2–1)
BUN SERPL-MCNC: 23 MG/DL (ref 5–25)
CALCIUM SERPL-MCNC: 9.3 MG/DL (ref 8.4–10.2)
CHLORIDE SERPL-SCNC: 109 MMOL/L (ref 96–108)
CHOLEST SERPL-MCNC: 204 MG/DL (ref ?–200)
CO2 SERPL-SCNC: 29 MMOL/L (ref 21–32)
CREAT SERPL-MCNC: 0.68 MG/DL (ref 0.6–1.3)
EOSINOPHIL # BLD AUTO: 0.05 THOUSAND/ÂΜL (ref 0–0.61)
EOSINOPHIL NFR BLD AUTO: 1 % (ref 0–6)
ERYTHROCYTE [DISTWIDTH] IN BLOOD BY AUTOMATED COUNT: 14.2 % (ref 11.6–15.1)
GFR SERPL CREATININE-BSD FRML MDRD: 84 ML/MIN/1.73SQ M
GLUCOSE P FAST SERPL-MCNC: 94 MG/DL (ref 65–99)
HCT VFR BLD AUTO: 43.3 % (ref 34.8–46.1)
HDLC SERPL-MCNC: 71 MG/DL
HGB BLD-MCNC: 14 G/DL (ref 11.5–15.4)
IMM GRANULOCYTES # BLD AUTO: 0.03 THOUSAND/UL (ref 0–0.2)
IMM GRANULOCYTES NFR BLD AUTO: 0 % (ref 0–2)
LDLC SERPL CALC-MCNC: 118 MG/DL (ref 0–100)
LYMPHOCYTES # BLD AUTO: 2.37 THOUSANDS/ÂΜL (ref 0.6–4.47)
LYMPHOCYTES NFR BLD AUTO: 33 % (ref 14–44)
MCH RBC QN AUTO: 29.4 PG (ref 26.8–34.3)
MCHC RBC AUTO-ENTMCNC: 32.3 G/DL (ref 31.4–37.4)
MCV RBC AUTO: 91 FL (ref 82–98)
MONOCYTES # BLD AUTO: 0.64 THOUSAND/ÂΜL (ref 0.17–1.22)
MONOCYTES NFR BLD AUTO: 9 % (ref 4–12)
NEUTROPHILS # BLD AUTO: 3.97 THOUSANDS/ÂΜL (ref 1.85–7.62)
NEUTS SEG NFR BLD AUTO: 56 % (ref 43–75)
NONHDLC SERPL-MCNC: 133 MG/DL
NRBC BLD AUTO-RTO: 0 /100 WBCS
PLATELET # BLD AUTO: 204 THOUSANDS/UL (ref 149–390)
PMV BLD AUTO: 11.5 FL (ref 8.9–12.7)
POTASSIUM SERPL-SCNC: 4.8 MMOL/L (ref 3.5–5.3)
PROT SERPL-MCNC: 6.5 G/DL (ref 6.4–8.4)
RBC # BLD AUTO: 4.76 MILLION/UL (ref 3.81–5.12)
SODIUM SERPL-SCNC: 143 MMOL/L (ref 135–147)
TRIGL SERPL-MCNC: 73 MG/DL (ref ?–150)
WBC # BLD AUTO: 7.12 THOUSAND/UL (ref 4.31–10.16)

## 2025-03-31 PROCEDURE — 80053 COMPREHEN METABOLIC PANEL: CPT

## 2025-03-31 PROCEDURE — 80061 LIPID PANEL: CPT

## 2025-03-31 PROCEDURE — 36415 COLL VENOUS BLD VENIPUNCTURE: CPT

## 2025-03-31 PROCEDURE — 85025 COMPLETE CBC W/AUTO DIFF WBC: CPT

## 2025-04-07 ENCOUNTER — TELEPHONE (OUTPATIENT)
Dept: ADMINISTRATIVE | Facility: OTHER | Age: 80
End: 2025-04-07

## 2025-04-07 NOTE — TELEPHONE ENCOUNTER
04/07/25 2:04 PM    Patient contacted to bring Advance Directive, POLST, or Living Will document to next scheduled pcp visit.[facility] Department spoke with patient/ caregiver.    Thank you.  Karen Urbano MA  [facility]

## 2025-04-08 ENCOUNTER — OFFICE VISIT (OUTPATIENT)
Age: 80
End: 2025-04-08
Payer: MEDICARE

## 2025-04-08 VITALS
SYSTOLIC BLOOD PRESSURE: 157 MMHG | HEART RATE: 69 BPM | TEMPERATURE: 97.7 F | RESPIRATION RATE: 16 BRPM | OXYGEN SATURATION: 100 % | HEIGHT: 57 IN | WEIGHT: 129.2 LBS | DIASTOLIC BLOOD PRESSURE: 83 MMHG | BODY MASS INDEX: 27.87 KG/M2

## 2025-04-08 DIAGNOSIS — R03.0 ELEVATED BP WITHOUT DIAGNOSIS OF HYPERTENSION: ICD-10-CM

## 2025-04-08 DIAGNOSIS — E78.2 MIXED HYPERLIPIDEMIA: ICD-10-CM

## 2025-04-08 DIAGNOSIS — I87.2 VENOUS INCOMPETENCE: ICD-10-CM

## 2025-04-08 DIAGNOSIS — M51.16 INTERVERTEBRAL DISC DISORDER WITH RADICULOPATHY OF LUMBAR REGION: ICD-10-CM

## 2025-04-08 DIAGNOSIS — Z00.00 MEDICARE ANNUAL WELLNESS VISIT, SUBSEQUENT: Primary | ICD-10-CM

## 2025-04-08 DIAGNOSIS — Z13.820 ENCOUNTER FOR OSTEOPOROSIS SCREENING IN ASYMPTOMATIC POSTMENOPAUSAL PATIENT: ICD-10-CM

## 2025-04-08 DIAGNOSIS — Z78.0 ENCOUNTER FOR OSTEOPOROSIS SCREENING IN ASYMPTOMATIC POSTMENOPAUSAL PATIENT: ICD-10-CM

## 2025-04-08 DIAGNOSIS — C84.09 MYCOSIS FUNGOIDES, EXTRANODAL AND SOLID ORGAN SITES (HCC): ICD-10-CM

## 2025-04-08 PROCEDURE — G0439 PPPS, SUBSEQ VISIT: HCPCS | Performed by: INTERNAL MEDICINE

## 2025-04-08 NOTE — ASSESSMENT & PLAN NOTE
Component      Latest Ref Rng 4/19/2023 11/9/2023 9/23/2024 3/31/2025   Cholesterol      See Comment mg/dL 228 (H)  194  207 (H)  204 (H)    HDL      >=50 mg/dL 80  73  69  71    Triglycerides      See Comment mg/dL 69  58  76  73    LDL Calculated      0 - 100 mg/dL 132 (H)  109 (H)  123 (H)  118 (H)    Non-HDL Cholesterol      mg/dl 148 (H)  121  138  133       ASCVD of 18%  Patient wants to continue to defer medications, she has an upcoming appointment with her cardiology soon and will discuss at the office visit.  Continue low-fat diet and regular physical activity

## 2025-04-08 NOTE — PROGRESS NOTES
Name: Annalisa Malone      : 1945      MRN: [id number]  Encounter Provider: Alfredito Magana MD  Encounter Date: 2025   Encounter department: [facility]  :  Assessment & Plan  Medicare annual wellness visit, subsequent         Venous incompetence         Intervertebral disc disorder with radiculopathy of lumbar region         Mixed hyperlipidemia  Component      Latest Ref Rng 2023 2023 2024 3/31/2025   Cholesterol      See Comment mg/dL 228 (H)  194  207 (H)  204 (H)    HDL      >=50 mg/dL 80  73  69  71    Triglycerides      See Comment mg/dL 69  58  76  73    LDL Calculated      0 - 100 mg/dL 132 (H)  109 (H)  123 (H)  118 (H)    Non-HDL Cholesterol      mg/dl 148 (H)  121  138  133       ASCVD of 18%  Patient wants to continue to defer medications, she has an upcoming appointment with her cardiology soon and will discuss at the office visit.  Continue low-fat diet and regular physical activity    Encounter for osteoporosis screening in asymptomatic postmenopausal patient    Orders:    DXA bone density spine hip and pelvis; Future    Mycosis fungoides, extranodal and solid organ sites (HCC)         Elevated BP without diagnosis of hypertension  BP running high, recommend home BP logs, encourage DASH diet, patient will discuss with cardiology at upcoming office visit.          Preventive health issues were discussed with patient, and age appropriate screening tests were ordered as noted in patient's After Visit Summary. Personalized health advice and appropriate referrals for health education or preventive services given if needed, as noted in patient's After Visit Summary.    History of Present Illness     HPI     Patient comes for Medicare annual wellness visit.  Denies any subjective complaints today.  Patient Care Team:  Alfredito Magana MD as PCP - General (Internal Medicine)  MD Sunshine Jimenez MD (Obstetrics and  Gynecology)      Past Medical History   Past Medical History:   Diagnosis Date    Dizziness     Hyperlipidemia     Mild    Lung mass     Lymphoma (HCC)     Menopause     Uterine leiomyoma      Past Surgical History:   Procedure Laterality Date    BREAST BIOPSY  2014    COLONOSCOPY      complete    ENDOMETRIAL BIOPSY      without cervical dilation    LAPAROSCOPY      (diagnostic)    LEG SURGERY      back of the leg     TONSILLECTOMY      TUBAL LIGATION       Family History   Problem Relation Age of Onset    Dementia Mother     Heart disease Father     No Known Problems Daughter     No Known Problems Maternal Grandmother     Leukemia Maternal Grandfather     No Known Problems Paternal Grandmother     No Known Problems Paternal Grandfather     Cancer Maternal Aunt     No Known Problems Paternal Aunt     No Known Problems Paternal Aunt     No Known Problems Paternal Aunt     Breast cancer additional onset Cousin 34    Breast cancer Other       reports that she has quit smoking. Her smoking use included cigarettes. She has a 30 pack-year smoking history. She has never used smokeless tobacco. She reports current alcohol use. She reports that she does not use drugs.  Current Outpatient Medications   Medication Instructions    Calcium 500 MG tablet 2 tablets, Daily    Cyanocobalamin (B-12) 1000 MCG CAPS 1 capsule, Daily    furosemide (LASIX) 20 mg, Oral, Daily    Multiple Vitamin (MULTI-VITAMIN DAILY) TABS 1 tablet, Daily    Omega-3 Fatty Acids (FISH OIL) 645 MG CAPS 1 capsule, Daily    pregabalin (LYRICA) 75 mg, Oral, 3 times daily    Pyridoxine HCl (BL VITAMIN B-6 PO) 1 tablet, Daily   No Known Allergies   Review of Systems   Constitutional:  Negative for appetite change, chills, diaphoresis, fatigue, fever and unexpected weight change.   Respiratory:  Negative for apnea, cough, choking, chest tightness, shortness of breath, wheezing and stridor.    Cardiovascular:  Negative for chest pain, palpitations and leg swelling.    Gastrointestinal:  Negative for abdominal distention, abdominal pain, anal bleeding, blood in stool, constipation, diarrhea, nausea and vomiting.   Genitourinary:  Negative for decreased urine volume, difficulty urinating, frequency and urgency.   Musculoskeletal:  Negative for arthralgias, back pain and myalgias.   Neurological:  Negative for dizziness, light-headedness, numbness and headaches.     Medical History Reviewed by provider this encounter:       Annual Wellness Visit Questionnaire   Annalisa is here for her Subsequent Wellness visit. Last Medicare Wellness visit information reviewed, patient interviewed, no change since last AWV.     Health Risk Assessment:   Patient rates overall health as very good. Patient feels that their physical health rating is same. Patient is very satisfied with their life. Eyesight was rated as same. Hearing was rated as same. Patient feels that their emotional and mental health rating is same. Patients states they are sometimes angry. Patient states they are never, rarely unusually tired/fatigued. Pain experienced in the last 7 days has been none. Patient states that she has experienced no weight loss or gain in last 6 months.     Depression Screening:   PHQ-2 Score: 0      Fall Risk Screening:   In the past year, patient has experienced: no history of falling in past year      Urinary Incontinence Screening:   Patient has not leaked urine accidently in the last six months.     Home Safety:  Patient does not have trouble with stairs inside or outside of their home. Patient has working smoke alarms and has no working carbon monoxide detector. Home safety hazards include: none.     Nutrition:   Current diet is Low Carb and No Added Salt.     Medications:   Patient is currently taking over-the-counter supplements. OTC medications include: see medication list. Patient is able to manage medications.     Activities of Daily Living (ADLs)/Instrumental Activities of Daily Living  (IADLs):   Walk and transfer into and out of bed and chair?: Yes  Dress and groom yourself?: Yes    Bathe or shower yourself?: Yes    Feed yourself? Yes  Do your laundry/housekeeping?: Yes  Manage your money, pay your bills and track your expenses?: Yes  Make your own meals?: Yes    Do your own shopping?: Yes    Previous Hospitalizations:   Any hospitalizations or ED visits within the last 12 months?: No      Advance Care Planning:   Living will: Yes    Durable POA for healthcare: No    Advanced directive: Yes      Cognitive Screening:   Provider or family/friend/caregiver concerned regarding cognition?: No    Preventive Screenings      Cardiovascular Screening:    General: Screening Not Indicated and History Lipid Disorder      Diabetes Screening:     General: Screening Current      Colorectal Cancer Screening:     General: Screening Current      Breast Cancer Screening:     General: Screening Current      Cervical Cancer Screening:    General: Screening Not Indicated      Osteoporosis Screening:    General: Screening Current      Abdominal Aortic Aneurysm (AAA) Screening:        General: Screening Not Indicated      Hepatitis C Screening:    General: Screening Current    Immunizations:  - Immunizations due: Influenza and Zoster (Shingrix)    Screening, Brief Intervention, and Referral to Treatment (SBIRT)     Screening  Typical number of drinks in a day: 0  Typical number of drinks in a week: 0  Interpretation: Low risk drinking behavior.    AUDIT-C Screenin) How often did you have a drink containing alcohol in the past year? 2 to 4 times a month  2) How many drinks did you have on a typical day when you were drinking in the past year? 0  3) How often did you have 6 or more drinks on one occasion in the past year? never    AUDIT-C Score: 2  Interpretation: Score 0-2 (female): Negative screen for alcohol misuse    Single Item Drug Screening:  How often have you used an illegal drug (including marijuana) or a  "prescription medication for non-medical reasons in the past year? never    Single Item Drug Screen Score: 0  Interpretation: Negative screen for possible drug use disorder    Other Counseling Topics:   Car/seat belt/driving safety, skin self-exam, sunscreen and calcium and vitamin D intake and regular weightbearing exercise.     Social Drivers of Health     Financial Resource Strain: Low Risk  (3/30/2023)    Overall Financial Resource Strain (CARDIA)     Difficulty of Paying Living Expenses: Not hard at all   Food Insecurity: No Food Insecurity (4/4/2025)    Hunger Vital Sign     Worried About Running Out of Food in the Last Year: Never true     Ran Out of Food in the Last Year: Never true   Transportation Needs: No Transportation Needs (4/4/2025)    PRAPARE - Transportation     Lack of Transportation (Medical): No     Lack of Transportation (Non-Medical): No   Housing Stability: Unknown (4/4/2025)    Housing Stability Vital Sign     Unable to Pay for Housing in the Last Year: No     Homeless in the Last Year: No   Utilities: Not At Risk (4/4/2025)    [facility] Utilities     Threatened with loss of utilities: No     No results found.    Objective   /83 (Patient Position: Sitting, Cuff Size: Standard)   Pulse 69   Temp 97.7 °F (36.5 °C) (Temporal)   Resp 16   Ht 4' 9\" (1.448 m)   Wt 58.6 kg (129 lb 3.2 oz)   SpO2 100%   BMI 27.96 kg/m²     Physical Exam    "

## 2025-04-08 NOTE — PATIENT INSTRUCTIONS
Medicare Preventive Visit Patient Instructions  Thank you for completing your Welcome to Medicare Visit or Medicare Annual Wellness Visit today. Your next wellness visit will be due in one year (4/9/2026).  The screening/preventive services that you may require over the next 5-10 years are detailed below. Some tests may not apply to you based off risk factors and/or age. Screening tests ordered at today's visit but not completed yet may show as past due. Also, please note that scanned in results may not display below.  Preventive Screenings:  Service Recommendations Previous Testing/Comments   Colorectal Cancer Screening  * Colonoscopy    * Fecal Occult Blood Test (FOBT)/Fecal Immunochemical Test (FIT)  * Fecal DNA/Cologuard Test  * Flexible Sigmoidoscopy Age: 45-75 years old   Colonoscopy: every 10 years (may be performed more frequently if at higher risk)  OR  FOBT/FIT: every 1 year  OR  Cologuard: every 3 years  OR  Sigmoidoscopy: every 5 years  Screening may be recommended earlier than age 45 if at higher risk for colorectal cancer. Also, an individualized decision between you and your healthcare provider will decide whether screening between the ages of 76-85 would be appropriate. Colonoscopy: 08/09/2021  FOBT/FIT: Not on file  Cologuard: Not on file  Sigmoidoscopy: Not on file    Screening Current     Breast Cancer Screening Age: 40+ years old  Frequency: every 1-2 years  Not required if history of left and right mastectomy Mammogram: 09/26/2024    Screening Current   Cervical Cancer Screening Between the ages of 21-29, pap smear recommended once every 3 years.   Between the ages of 30-65, can perform pap smear with HPV co-testing every 5 years.   Recommendations may differ for women with a history of total hysterectomy, cervical cancer, or abnormal pap smears in past. Pap Smear: 06/18/2024    Screening Not Indicated   Hepatitis C Screening Once for adults born between 1945 and 1965  More frequently in  patients at high risk for Hepatitis C Hep C Antibody: 03/29/2021    Screening Current   Diabetes Screening 1-2 times per year if you're at risk for diabetes or have pre-diabetes Fasting glucose: 94 mg/dL (3/31/2025)  A1C: No results in last 5 years (No results in last 5 years)  Screening Current   Cholesterol Screening Once every 5 years if you don't have a lipid disorder. May order more often based on risk factors. Lipid panel: 03/31/2025    Screening Not Indicated  History Lipid Disorder     Other Preventive Screenings Covered by Medicare:  Abdominal Aortic Aneurysm (AAA) Screening: covered once if your at risk. You're considered to be at risk if you have a family history of AAA.  Lung Cancer Screening: covers low dose CT scan once per year if you meet all of the following conditions: (1) Age 55-77; (2) No signs or symptoms of lung cancer; (3) Current smoker or have quit smoking within the last 15 years; (4) You have a tobacco smoking history of at least 20 pack years (packs per day multiplied by number of years you smoked); (5) You get a written order from a healthcare provider.  Glaucoma Screening: covered annually if you're considered high risk: (1) You have diabetes OR (2) Family history of glaucoma OR (3)  aged 50 and older OR (4)  American aged 65 and older  Osteoporosis Screening: covered every 2 years if you meet one of the following conditions: (1) You're estrogen deficient and at risk for osteoporosis based off medical history and other findings; (2) Have a vertebral abnormality; (3) On glucocorticoid therapy for more than 3 months; (4) Have primary hyperparathyroidism; (5) On osteoporosis medications and need to assess response to drug therapy.   Last bone density test (DXA Scan): 09/15/2023.  HIV Screening: covered annually if you're between the age of 15-65. Also covered annually if you are younger than 15 and older than 65 with risk factors for HIV infection. For pregnant  patients, it is covered up to 3 times per pregnancy.    Immunizations:  Immunization Recommendations   Influenza Vaccine Annual influenza vaccination during flu season is recommended for all persons aged >= 6 months who do not have contraindications   Pneumococcal Vaccine   * Pneumococcal conjugate vaccine = PCV13 (Prevnar 13), PCV15 (Vaxneuvance), PCV20 (Prevnar 20)  * Pneumococcal polysaccharide vaccine = PPSV23 (Pneumovax) Adults 19-65 yo with certain risk factors or if 65+ yo  If never received any pneumonia vaccine: recommend Prevnar 20 (PCV20)  Give PCV20 if previously received 1 dose of PCV13 or PPSV23   Hepatitis B Vaccine 3 dose series if at intermediate or high risk (ex: diabetes, end stage renal disease, liver disease)   Respiratory syncytial virus (RSV) Vaccine - COVERED BY MEDICARE PART D  * RSVPreF3 (Arexvy) CDC recommends that adults 60 years of age and older may receive a single dose of RSV vaccine using shared clinical decision-making (SCDM)   Tetanus (Td) Vaccine - COST NOT COVERED BY MEDICARE PART B Following completion of primary series, a booster dose should be given every 10 years to maintain immunity against tetanus. Td may also be given as tetanus wound prophylaxis.   Tdap Vaccine - COST NOT COVERED BY MEDICARE PART B Recommended at least once for all adults. For pregnant patients, recommended with each pregnancy.   Shingles Vaccine (Shingrix) - COST NOT COVERED BY MEDICARE PART B  2 shot series recommended in those 19 years and older who have or will have weakened immune systems or those 50 years and older     Health Maintenance Due:      Topic Date Due   • Breast Cancer Screening: Mammogram  09/26/2025   • Colorectal Cancer Screening  08/09/2026   • Hepatitis C Screening  Completed     Immunizations Due:      Topic Date Due   • Influenza Vaccine (1) 09/01/2024   • COVID-19 Vaccine (5 - 2024-25 season) 09/01/2024     Advance Directives   What are advance directives?  Advance directives are  legal documents that state your wishes and plans for medical care. These plans are made ahead of time in case you lose your ability to make decisions for yourself. Advance directives can apply to any medical decision, such as the treatments you want, and if you want to donate organs.   What are the types of advance directives?  There are many types of advance directives, and each state has rules about how to use them. You may choose a combination of any of the following:  Living will:  This is a written record of the treatment you want. You can also choose which treatments you do not want, which to limit, and which to stop at a certain time. This includes surgery, medicine, IV fluid, and tube feedings.   Durable power of  for healthcare (DPAHC):  This is a written record that states who you want to make healthcare choices for you when you are unable to make them for yourself. This person, called a proxy, is usually a family member or a friend. You may choose more than 1 proxy.  Do not resuscitate (DNR) order:  A DNR order is used in case your heart stops beating or you stop breathing. It is a request not to have certain forms of treatment, such as CPR. A DNR order may be included in other types of advance directives.  Medical directive:  This covers the care that you want if you are in a coma, near death, or unable to make decisions for yourself. You can list the treatments you want for each condition. Treatment may include pain medicine, surgery, blood transfusions, dialysis, IV or tube feedings, and a ventilator (breathing machine).  Values history:  This document has questions about your views, beliefs, and how you feel and think about life. This information can help others choose the care that you would choose.  Why are advance directives important?  An advance directive helps you control your care. Although spoken wishes may be used, it is better to have your wishes written down. Spoken wishes can be  misunderstood, or not followed. Treatments may be given even if you do not want them. An advance directive may make it easier for your family to make difficult choices about your care.   Weight Management   Why it is important to manage your weight:  Being overweight increases your risk of health conditions such as heart disease, high blood pressure, type 2 diabetes, and certain types of cancer. It can also increase your risk for osteoarthritis, sleep apnea, and other respiratory problems. Aim for a slow, steady weight loss. Even a small amount of weight loss can lower your risk of health problems.  How to lose weight safely:  A safe and healthy way to lose weight is to eat fewer calories and get regular exercise. You can lose up about 1 pound a week by decreasing the number of calories you eat by 500 calories each day.   Healthy meal plan for weight management:  A healthy meal plan includes a variety of foods, contains fewer calories, and helps you stay healthy. A healthy meal plan includes the following:  Eat whole-grain foods more often.  A healthy meal plan should contain fiber. Fiber is the part of grains, fruits, and vegetables that is not broken down by your body. Whole-grain foods are healthy and provide extra fiber in your diet. Some examples of whole-grain foods are whole-wheat breads and pastas, oatmeal, brown rice, and bulgur.  Eat a variety of vegetables every day.  Include dark, leafy greens such as spinach, kale, karri greens, and mustard greens. Eat yellow and orange vegetables such as carrots, sweet potatoes, and winter squash.   Eat a variety of fruits every day.  Choose fresh or canned fruit (canned in its own juice or light syrup) instead of juice. Fruit juice has very little or no fiber.  Eat low-fat dairy foods.  Drink fat-free (skim) milk or 1% milk. Eat fat-free yogurt and low-fat cottage cheese. Try low-fat cheeses such as mozzarella and other reduced-fat cheeses.  Choose meat and other  protein foods that are low in fat.  Choose beans or other legumes such as split peas or lentils. Choose fish, skinless poultry (chicken or turkey), or lean cuts of red meat (beef or pork). Before you cook meat or poultry, cut off any visible fat.   Use less fat and oil.  Try baking foods instead of frying them. Add less fat, such as margarine, sour cream, regular salad dressing and mayonnaise to foods. Eat fewer high-fat foods. Some examples of high-fat foods include french fries, doughnuts, ice cream, and cakes.  Eat fewer sweets.  Limit foods and drinks that are high in sugar. This includes candy, cookies, regular soda, and sweetened drinks.  Exercise:  Exercise at least 30 minutes per day on most days of the week. Some examples of exercise include walking, biking, dancing, and swimming. You can also fit in more physical activity by taking the stairs instead of the elevator or parking farther away from stores. Ask your healthcare provider about the best exercise plan for you.      © Copyright [employer] 2018 Information is for End User's use only and may not be sold, redistributed or otherwise used for commercial purposes. All illustrations and images included in CareNotes® are the copyrighted property of [employer] or [employer]

## 2025-06-24 ENCOUNTER — ANNUAL EXAM (OUTPATIENT)
Dept: OBGYN CLINIC | Facility: CLINIC | Age: 80
End: 2025-06-24
Payer: MEDICARE

## 2025-06-24 VITALS
DIASTOLIC BLOOD PRESSURE: 80 MMHG | BODY MASS INDEX: 26.87 KG/M2 | WEIGHT: 128 LBS | SYSTOLIC BLOOD PRESSURE: 122 MMHG | HEIGHT: 58 IN

## 2025-06-24 DIAGNOSIS — Z12.31 ENCOUNTER FOR SCREENING MAMMOGRAM FOR MALIGNANT NEOPLASM OF BREAST: ICD-10-CM

## 2025-06-24 DIAGNOSIS — Z01.419 ENCNTR FOR GYN EXAM (GENERAL) (ROUTINE) W/O ABN FINDINGS: Primary | ICD-10-CM

## 2025-06-24 PROCEDURE — G0101 CA SCREEN;PELVIC/BREAST EXAM: HCPCS | Performed by: OBSTETRICS & GYNECOLOGY

## 2025-06-24 RX ORDER — AMLODIPINE BESYLATE 2.5 MG/1
2.5 TABLET ORAL DAILY
COMMUNITY
Start: 2025-05-20 | End: 2026-05-20

## 2025-06-24 NOTE — PROGRESS NOTES
"Annual Wellness Visit  Name: Annalisa Malone      : 1945      MRN: [id number]  Encounter Provider: Sunshine Corey MD  Encounter Date: 2025   Encounter department: [facility] [place]    80 y.o.  yo presents today for her annual exam.:  Assessment & Plan  Encntr for gyn exam (general) (routine) w/o abn findings  Pap no longer indicated  Mammogram scheduled  Colonoscopy no longer indicated per patient (told none needed past 75)  DEXA scheduled       Encounter for screening mammogram for malignant neoplasm of breast                  History of Present Illness     Annalisa Malone is a 80 y.o. female who presents for annual well woman exam.    GYN:  denies vaginal discharge, labial erythema or lesions, dyspareunia.  Menses are absent  Contraception: n/a.  Patient is sexually active with male partner.  Hx gynecologic surgeries: tubal sterilization    OB:   female    :  denies dysuria, urinary frequency or urgency.  denies hematuria, flank pain, incontinence.  denies constipation, diarrhea    Breast:  denies breast mass, skin changes, dimpling, reddening, nipple retraction.  denies breast discharge.  Patient does have a family history of breast CA in her M cousin and M aunt; no hx endometrial or ovarian ca.     General:  ETOH use: occasional  Tobacco use: former; denies current use  Recreational drug use: denies    Screening:  Cervical cancer: last pap smear in 2021. Results were normal.  Breast cancer: last mammogram in 2024. Results were BIRAD-2.  Colon cancer: last colonoscopy in 2021. Patient states she was told she no longer needed them after 75 unless problems develop  Depression screening: negative    Review of Systems as per [facility]  Medical History Reviewed by provider this encounter:  Tobacco  Med Hx  Surg Hx  Fam Hx  Soc Hx    .     Objective   /80   Ht 4' 9.87\" (1.47 m)   Wt 58.1 kg (128 lb)   BMI 26.87 kg/m²    "   Physical Exam  Constitutional:       Appearance: Normal appearance. She is well-developed.   Neck:      Thyroid: No thyromegaly.   Chest:   Breasts:     Breasts are symmetrical.      Right: Normal. No swelling, bleeding, inverted nipple, mass, nipple discharge, skin change or tenderness.      Left: Normal. No swelling, bleeding, inverted nipple, mass, nipple discharge, skin change or tenderness.   Genitourinary:     General: Normal vulva.      Labia:         Right: No rash, tenderness or lesion.         Left: No rash, tenderness or lesion.       Urethra: No prolapse, urethral pain, urethral swelling or urethral lesion.      Vagina: Normal.      Cervix: Normal.      Uterus: Normal. Not enlarged, not fixed and not tender.       Adnexa: Right adnexa normal and left adnexa normal.        Right: No mass or tenderness.          Left: No mass or tenderness.        Comments: +clogged sebaceous gland on right labia majora    Musculoskeletal:      Cervical back: Neck supple.   Lymphadenopathy:      Cervical: No cervical adenopathy.      Upper Body:      Right upper body: No axillary adenopathy.      Left upper body: No axillary adenopathy.     Neurological:      Mental Status: She is alert.

## 2025-08-11 ENCOUNTER — OFFICE VISIT (OUTPATIENT)
Age: 80
End: 2025-08-11
Payer: MEDICARE